# Patient Record
Sex: MALE | Race: WHITE | NOT HISPANIC OR LATINO | URBAN - METROPOLITAN AREA
[De-identification: names, ages, dates, MRNs, and addresses within clinical notes are randomized per-mention and may not be internally consistent; named-entity substitution may affect disease eponyms.]

---

## 2017-03-24 ENCOUNTER — OUTPATIENT (OUTPATIENT)
Dept: OUTPATIENT SERVICES | Facility: HOSPITAL | Age: 77
LOS: 1 days | Discharge: HOME | End: 2017-03-24

## 2017-03-24 DIAGNOSIS — C85.88 OTHER SPECIFIED TYPES OF NON-HODGKIN LYMPHOMA, LYMPH NODES OF MULTIPLE SITES: ICD-10-CM

## 2017-03-26 ENCOUNTER — RESULT REVIEW (OUTPATIENT)
Age: 77
End: 2017-03-26

## 2017-03-27 ENCOUNTER — APPOINTMENT (OUTPATIENT)
Dept: HEMATOLOGY ONCOLOGY | Facility: CLINIC | Age: 77
End: 2017-03-27

## 2017-03-27 VITALS
DIASTOLIC BLOOD PRESSURE: 78 MMHG | TEMPERATURE: 98.7 F | WEIGHT: 171.96 LBS | RESPIRATION RATE: 16 BRPM | OXYGEN SATURATION: 93 % | HEART RATE: 92 BPM | SYSTOLIC BLOOD PRESSURE: 113 MMHG

## 2017-03-27 DIAGNOSIS — G89.29 LOW BACK PAIN: ICD-10-CM

## 2017-03-27 DIAGNOSIS — M54.5 LOW BACK PAIN: ICD-10-CM

## 2017-03-27 DIAGNOSIS — Z78.9 OTHER SPECIFIED HEALTH STATUS: ICD-10-CM

## 2017-03-27 DIAGNOSIS — I89.0 LYMPHEDEMA, NOT ELSEWHERE CLASSIFIED: ICD-10-CM

## 2017-03-27 DIAGNOSIS — Z86.79 PERSONAL HISTORY OF OTHER DISEASES OF THE CIRCULATORY SYSTEM: ICD-10-CM

## 2017-03-27 DIAGNOSIS — R97.20 ELEVATED PROSTATE, SPECIFIC ANTIGEN [PSA]: ICD-10-CM

## 2017-03-27 LAB
BASOPHILS # BLD AUTO: 0 K/UL — SIGNIFICANT CHANGE UP (ref 0–0.2)
BASOPHILS NFR BLD AUTO: 0.5 % — SIGNIFICANT CHANGE UP (ref 0–2)
EOSINOPHIL # BLD AUTO: 0 K/UL — SIGNIFICANT CHANGE UP (ref 0–0.5)
EOSINOPHIL NFR BLD AUTO: 0.7 % — SIGNIFICANT CHANGE UP (ref 0–6)
HCT VFR BLD CALC: 41.4 % — SIGNIFICANT CHANGE UP (ref 39–50)
HGB BLD-MCNC: 13.8 G/DL — SIGNIFICANT CHANGE UP (ref 13–17)
LYMPHOCYTES # BLD AUTO: 1 K/UL — SIGNIFICANT CHANGE UP (ref 1–3.3)
LYMPHOCYTES # BLD AUTO: 19.2 % — SIGNIFICANT CHANGE UP (ref 13–44)
MCHC RBC-ENTMCNC: 33 PG — SIGNIFICANT CHANGE UP (ref 27–34)
MCHC RBC-ENTMCNC: 33.2 G/DL — SIGNIFICANT CHANGE UP (ref 32–36)
MCV RBC AUTO: 99.1 FL — SIGNIFICANT CHANGE UP (ref 80–100)
MONOCYTES # BLD AUTO: 0.5 K/UL — SIGNIFICANT CHANGE UP (ref 0–0.9)
MONOCYTES NFR BLD AUTO: 8.8 % — SIGNIFICANT CHANGE UP (ref 2–14)
NEUTROPHILS # BLD AUTO: 3.7 K/UL — SIGNIFICANT CHANGE UP (ref 1.8–7.4)
NEUTROPHILS NFR BLD AUTO: 70.7 % — SIGNIFICANT CHANGE UP (ref 43–77)
PLATELET # BLD AUTO: 171 K/UL — SIGNIFICANT CHANGE UP (ref 150–400)
RBC # BLD: 4.18 M/UL — LOW (ref 4.2–5.8)
RBC # FLD: 11.3 % — SIGNIFICANT CHANGE UP (ref 10.3–14.5)
WBC # BLD: 5.3 K/UL — SIGNIFICANT CHANGE UP (ref 3.8–10.5)
WBC # FLD AUTO: 5.3 K/UL — SIGNIFICANT CHANGE UP (ref 3.8–10.5)

## 2017-03-31 LAB
ALBUMIN MFR SERPL ELPH: 63.7 %
ALBUMIN SERPL ELPH-MCNC: 3.9 G/DL
ALBUMIN SERPL-MCNC: 3.6 G/DL
ALBUMIN/GLOB SERPL: 1.7 RATIO
ALP BLD-CCNC: 72 U/L
ALPHA1 GLOB MFR SERPL ELPH: 4.3 %
ALPHA1 GLOB SERPL ELPH-MCNC: 0.2 G/DL
ALPHA2 GLOB MFR SERPL ELPH: 10.1 %
ALPHA2 GLOB SERPL ELPH-MCNC: 0.6 G/DL
ALT SERPL-CCNC: 17 U/L
ANION GAP SERPL CALC-SCNC: 15 MMOL/L
AST SERPL-CCNC: 25 U/L
B-GLOBULIN MFR SERPL ELPH: 10.1 %
B-GLOBULIN SERPL ELPH-MCNC: 0.6 G/DL
B2 MICROGLOB SERPL-MCNC: 2.3 MG/L
BILIRUB SERPL-MCNC: 1.7 MG/DL
BUN SERPL-MCNC: 29 MG/DL
CALCIUM SERPL-MCNC: 8.8 MG/DL
CHLORIDE SERPL-SCNC: 104 MMOL/L
CO2 SERPL-SCNC: 26 MMOL/L
CREAT SERPL-MCNC: 1.29 MG/DL
GAMMA GLOB FLD ELPH-MCNC: 0.7 G/DL
GAMMA GLOB MFR SERPL ELPH: 11.8 %
GLUCOSE SERPL-MCNC: 114 MG/DL
HBV SURFACE AB SER QL: NONREACTIVE
HBV SURFACE AG SER QL: NONREACTIVE
HCV AB SER QL: NONREACTIVE
HCV S/CO RATIO: 0.11 S/CO
HIV1+2 AB SPEC QL IA.RAPID: NONREACTIVE
IGA SER QL IEP: 140 MG/DL
IGG SER QL IEP: 1060 MG/DL
IGM SER QL IEP: 90 MG/DL
INTERPRETATION SERPL IEP-IMP: NORMAL
LDH SERPL-CCNC: 173 U/L
POTASSIUM SERPL-SCNC: 4.5 MMOL/L
PROT SERPL-MCNC: 5.7 G/DL
SODIUM SERPL-SCNC: 145 MMOL/L

## 2017-04-01 ENCOUNTER — TRANSCRIPTION ENCOUNTER (OUTPATIENT)
Age: 77
End: 2017-04-01

## 2017-12-08 ENCOUNTER — EMERGENCY (EMERGENCY)
Facility: HOSPITAL | Age: 77
LOS: 0 days | Discharge: HOME | End: 2017-12-08
Attending: EMERGENCY MEDICINE | Admitting: EMERGENCY MEDICINE
Payer: MEDICARE

## 2017-12-08 VITALS
RESPIRATION RATE: 18 BRPM | TEMPERATURE: 98 F | SYSTOLIC BLOOD PRESSURE: 170 MMHG | HEART RATE: 72 BPM | DIASTOLIC BLOOD PRESSURE: 115 MMHG | HEIGHT: 70 IN | WEIGHT: 169.98 LBS | OXYGEN SATURATION: 100 %

## 2017-12-08 VITALS
TEMPERATURE: 98 F | OXYGEN SATURATION: 100 % | HEART RATE: 68 BPM | SYSTOLIC BLOOD PRESSURE: 165 MMHG | RESPIRATION RATE: 18 BRPM | DIASTOLIC BLOOD PRESSURE: 86 MMHG

## 2017-12-08 DIAGNOSIS — R33.9 RETENTION OF URINE, UNSPECIFIED: ICD-10-CM

## 2017-12-08 DIAGNOSIS — I25.2 OLD MYOCARDIAL INFARCTION: ICD-10-CM

## 2017-12-08 DIAGNOSIS — J43.9 EMPHYSEMA, UNSPECIFIED: ICD-10-CM

## 2017-12-08 DIAGNOSIS — E78.5 HYPERLIPIDEMIA, UNSPECIFIED: ICD-10-CM

## 2017-12-08 DIAGNOSIS — Z98.61 CORONARY ANGIOPLASTY STATUS: ICD-10-CM

## 2017-12-08 DIAGNOSIS — Z86.73 PERSONAL HISTORY OF TRANSIENT ISCHEMIC ATTACK (TIA), AND CEREBRAL INFARCTION WITHOUT RESIDUAL DEFICITS: ICD-10-CM

## 2017-12-08 DIAGNOSIS — Z91.011 ALLERGY TO MILK PRODUCTS: ICD-10-CM

## 2017-12-08 DIAGNOSIS — I25.10 ATHEROSCLEROTIC HEART DISEASE OF NATIVE CORONARY ARTERY WITHOUT ANGINA PECTORIS: ICD-10-CM

## 2017-12-08 DIAGNOSIS — Z87.01 PERSONAL HISTORY OF PNEUMONIA (RECURRENT): ICD-10-CM

## 2017-12-08 DIAGNOSIS — I48.91 UNSPECIFIED ATRIAL FIBRILLATION: ICD-10-CM

## 2017-12-08 DIAGNOSIS — Z85.72 PERSONAL HISTORY OF NON-HODGKIN LYMPHOMAS: ICD-10-CM

## 2017-12-08 DIAGNOSIS — Z88.2 ALLERGY STATUS TO SULFONAMIDES: ICD-10-CM

## 2017-12-08 LAB
APPEARANCE UR: CLEAR — SIGNIFICANT CHANGE UP
BACTERIA # UR AUTO: NEGATIVE — SIGNIFICANT CHANGE UP
BILIRUB UR-MCNC: NEGATIVE — SIGNIFICANT CHANGE UP
COLOR SPEC: YELLOW — SIGNIFICANT CHANGE UP
DIFF PNL FLD: (no result)
EPI CELLS # UR: SIGNIFICANT CHANGE UP
GLUCOSE UR QL: NEGATIVE MG/DL — SIGNIFICANT CHANGE UP
KETONES UR-MCNC: NEGATIVE — SIGNIFICANT CHANGE UP
LEUKOCYTE ESTERASE UR-ACNC: NEGATIVE — SIGNIFICANT CHANGE UP
NITRITE UR-MCNC: NEGATIVE — SIGNIFICANT CHANGE UP
PH UR: 5 — SIGNIFICANT CHANGE UP (ref 5–8)
PROT UR-MCNC: NEGATIVE MG/DL — SIGNIFICANT CHANGE UP
RBC CASTS # UR COMP ASSIST: (no result) /HPF (ref 0–4)
SP GR SPEC: 1 — LOW (ref 1.01–1.02)
UROBILINOGEN FLD QL: NEGATIVE MG/DL — SIGNIFICANT CHANGE UP
WBC UR QL: SIGNIFICANT CHANGE UP

## 2017-12-08 PROCEDURE — 99283 EMERGENCY DEPT VISIT LOW MDM: CPT | Mod: 25

## 2017-12-08 NOTE — ED ADULT NURSE NOTE - OBJECTIVE STATEMENT
Pt reports prostate biopsy yesterday with difficulty voiding starting last night. Pt last voided 11pm. Pt doe placed as ordered using sterile technique. 16fr draining to SBD. Pt reports good relief at this time with yellow urine draining.  MD aware.

## 2017-12-08 NOTE — ED PROVIDER NOTE - PMH
Atrial Fibrillation    CAD (coronary artery disease)    Empyema of lung    Hyperlipidemia    Lymphoma    Old MI (myocardial infarction)    Recurrent pneumonia    TIA (transient ischemic attack)

## 2017-12-08 NOTE — ED PROVIDER NOTE - OBJECTIVE STATEMENT
78 yo male c/o urinary retention this morning, pt had prostate biopsy yesterday. no fever, chills, no noausea or vomiting

## 2017-12-08 NOTE — ED ADULT NURSE REASSESSMENT NOTE - NS ED NURSE REASSESS COMMENT FT1
Pt doe catheter changed to leg bag.  Pt verbalizes understanding of use of leg bag.  Pt ambulating with steady gait.

## 2017-12-12 ENCOUNTER — EMERGENCY (EMERGENCY)
Facility: HOSPITAL | Age: 77
LOS: 0 days | Discharge: HOME | End: 2017-12-12
Attending: EMERGENCY MEDICINE | Admitting: EMERGENCY MEDICINE
Payer: MEDICARE

## 2017-12-12 VITALS
RESPIRATION RATE: 18 BRPM | SYSTOLIC BLOOD PRESSURE: 128 MMHG | DIASTOLIC BLOOD PRESSURE: 87 MMHG | OXYGEN SATURATION: 100 % | HEART RATE: 61 BPM

## 2017-12-12 VITALS
DIASTOLIC BLOOD PRESSURE: 113 MMHG | WEIGHT: 164.91 LBS | SYSTOLIC BLOOD PRESSURE: 170 MMHG | TEMPERATURE: 98 F | HEART RATE: 114 BPM | HEIGHT: 70 IN

## 2017-12-12 DIAGNOSIS — Z88.2 ALLERGY STATUS TO SULFONAMIDES: ICD-10-CM

## 2017-12-12 DIAGNOSIS — E78.5 HYPERLIPIDEMIA, UNSPECIFIED: ICD-10-CM

## 2017-12-12 DIAGNOSIS — I25.2 OLD MYOCARDIAL INFARCTION: ICD-10-CM

## 2017-12-12 DIAGNOSIS — Z98.61 CORONARY ANGIOPLASTY STATUS: ICD-10-CM

## 2017-12-12 DIAGNOSIS — Z86.73 PERSONAL HISTORY OF TRANSIENT ISCHEMIC ATTACK (TIA), AND CEREBRAL INFARCTION WITHOUT RESIDUAL DEFICITS: ICD-10-CM

## 2017-12-12 DIAGNOSIS — Z98.890 OTHER SPECIFIED POSTPROCEDURAL STATES: Chronic | ICD-10-CM

## 2017-12-12 DIAGNOSIS — I48.91 UNSPECIFIED ATRIAL FIBRILLATION: ICD-10-CM

## 2017-12-12 DIAGNOSIS — Z91.011 ALLERGY TO MILK PRODUCTS: ICD-10-CM

## 2017-12-12 DIAGNOSIS — Z85.72 PERSONAL HISTORY OF NON-HODGKIN LYMPHOMAS: ICD-10-CM

## 2017-12-12 DIAGNOSIS — J43.9 EMPHYSEMA, UNSPECIFIED: ICD-10-CM

## 2017-12-12 DIAGNOSIS — Z87.01 PERSONAL HISTORY OF PNEUMONIA (RECURRENT): ICD-10-CM

## 2017-12-12 DIAGNOSIS — R33.9 RETENTION OF URINE, UNSPECIFIED: ICD-10-CM

## 2017-12-12 DIAGNOSIS — I25.10 ATHEROSCLEROTIC HEART DISEASE OF NATIVE CORONARY ARTERY WITHOUT ANGINA PECTORIS: ICD-10-CM

## 2017-12-12 LAB
APPEARANCE UR: CLEAR — SIGNIFICANT CHANGE UP
BACTERIA # UR AUTO: (no result)
BILIRUB UR-MCNC: NEGATIVE — SIGNIFICANT CHANGE UP
COLOR SPEC: YELLOW — SIGNIFICANT CHANGE UP
DIFF PNL FLD: (no result)
EPI CELLS # UR: SIGNIFICANT CHANGE UP
GLUCOSE UR QL: NEGATIVE MG/DL — SIGNIFICANT CHANGE UP
KETONES UR-MCNC: NEGATIVE — SIGNIFICANT CHANGE UP
LEUKOCYTE ESTERASE UR-ACNC: (no result)
NITRITE UR-MCNC: NEGATIVE — SIGNIFICANT CHANGE UP
PH UR: 6.5 — SIGNIFICANT CHANGE UP (ref 5–8)
PROT UR-MCNC: NEGATIVE MG/DL — SIGNIFICANT CHANGE UP
RBC CASTS # UR COMP ASSIST: >50 /HPF (ref 0–4)
SP GR SPEC: 1.01 — SIGNIFICANT CHANGE UP (ref 1.01–1.02)
UROBILINOGEN FLD QL: 1 MG/DL
WBC UR QL: SIGNIFICANT CHANGE UP

## 2017-12-12 PROCEDURE — 99283 EMERGENCY DEPT VISIT LOW MDM: CPT | Mod: 25

## 2017-12-12 NOTE — ED ADULT TRIAGE NOTE - CHIEF COMPLAINT QUOTE
States he was seen here several days ago for urinary retention, catheter was placed. Yesterday doe was removed by urologist and now patient having urinary retention. States he has been unable to urinate for several hours.

## 2017-12-12 NOTE — ED PROVIDER NOTE - OBJECTIVE STATEMENT
76 yo male pw urinary retention. Pt states he had urinary cathter removed yesterday after having it in for 3 days s/p retention s/p prostate biopsy.  Pt saw his urologist yesterday.

## 2018-12-03 ENCOUNTER — APPOINTMENT (OUTPATIENT)
Dept: OPHTHALMOLOGY | Facility: CLINIC | Age: 78
End: 2018-12-03
Payer: MEDICARE

## 2018-12-03 PROCEDURE — 92015 DETERMINE REFRACTIVE STATE: CPT

## 2018-12-03 PROCEDURE — 92060 SENSORIMOTOR EXAMINATION: CPT

## 2018-12-03 PROCEDURE — 92012 INTRM OPH EXAM EST PATIENT: CPT

## 2019-02-14 ENCOUNTER — OUTPATIENT (OUTPATIENT)
Dept: OUTPATIENT SERVICES | Facility: HOSPITAL | Age: 79
LOS: 1 days | Discharge: HOME | End: 2019-02-14

## 2019-02-14 DIAGNOSIS — C85.88 OTHER SPECIFIED TYPES OF NON-HODGKIN LYMPHOMA, LYMPH NODES OF MULTIPLE SITES: ICD-10-CM

## 2019-02-14 DIAGNOSIS — Z98.890 OTHER SPECIFIED POSTPROCEDURAL STATES: Chronic | ICD-10-CM

## 2019-02-24 ENCOUNTER — FORM ENCOUNTER (OUTPATIENT)
Age: 79
End: 2019-02-24

## 2019-02-25 ENCOUNTER — OUTPATIENT (OUTPATIENT)
Dept: OUTPATIENT SERVICES | Facility: HOSPITAL | Age: 79
LOS: 1 days | End: 2019-02-25
Payer: MEDICARE

## 2019-02-25 ENCOUNTER — RESULT REVIEW (OUTPATIENT)
Age: 79
End: 2019-02-25

## 2019-02-25 ENCOUNTER — APPOINTMENT (OUTPATIENT)
Dept: RADIOLOGY | Facility: IMAGING CENTER | Age: 79
End: 2019-02-25
Payer: MEDICARE

## 2019-02-25 ENCOUNTER — APPOINTMENT (OUTPATIENT)
Dept: HEMATOLOGY ONCOLOGY | Facility: CLINIC | Age: 79
End: 2019-02-25
Payer: MEDICARE

## 2019-02-25 VITALS
TEMPERATURE: 97.5 F | OXYGEN SATURATION: 96 % | RESPIRATION RATE: 16 BRPM | HEART RATE: 76 BPM | SYSTOLIC BLOOD PRESSURE: 149 MMHG | DIASTOLIC BLOOD PRESSURE: 90 MMHG | WEIGHT: 170.63 LBS

## 2019-02-25 DIAGNOSIS — Z87.09 PERSONAL HISTORY OF OTHER DISEASES OF THE RESPIRATORY SYSTEM: ICD-10-CM

## 2019-02-25 DIAGNOSIS — C82.90 FOLLICULAR LYMPHOMA, UNSPECIFIED, UNSPECIFIED SITE: ICD-10-CM

## 2019-02-25 DIAGNOSIS — Z92.21 PERSONAL HISTORY OF ANTINEOPLASTIC CHEMOTHERAPY: ICD-10-CM

## 2019-02-25 DIAGNOSIS — H53.2 DIPLOPIA: ICD-10-CM

## 2019-02-25 DIAGNOSIS — Z98.890 OTHER SPECIFIED POSTPROCEDURAL STATES: Chronic | ICD-10-CM

## 2019-02-25 DIAGNOSIS — R05 COUGH: ICD-10-CM

## 2019-02-25 LAB
ALBUMIN SERPL ELPH-MCNC: 4.4 G/DL
ALP BLD-CCNC: 85 U/L
ALT SERPL-CCNC: 19 U/L
ANION GAP SERPL CALC-SCNC: 9 MMOL/L
AST SERPL-CCNC: 28 U/L
B2 MICROGLOB SERPL-MCNC: 3.1 MG/L
BASOPHILS # BLD AUTO: 0 K/UL — SIGNIFICANT CHANGE UP (ref 0–0.2)
BASOPHILS NFR BLD AUTO: 0.4 % — SIGNIFICANT CHANGE UP (ref 0–2)
BILIRUB SERPL-MCNC: 1.3 MG/DL
BUN SERPL-MCNC: 23 MG/DL
CALCIUM SERPL-MCNC: 9.4 MG/DL
CHLORIDE SERPL-SCNC: 105 MMOL/L
CO2 SERPL-SCNC: 30 MMOL/L
CREAT SERPL-MCNC: 1.06 MG/DL
EOSINOPHIL # BLD AUTO: 0.1 K/UL — SIGNIFICANT CHANGE UP (ref 0–0.5)
EOSINOPHIL NFR BLD AUTO: 1.8 % — SIGNIFICANT CHANGE UP (ref 0–6)
GLUCOSE SERPL-MCNC: 101 MG/DL
HCT VFR BLD CALC: 41.3 % — SIGNIFICANT CHANGE UP (ref 39–50)
HGB BLD-MCNC: 14 G/DL — SIGNIFICANT CHANGE UP (ref 13–17)
LDH SERPL-CCNC: 238 U/L
LYMPHOCYTES # BLD AUTO: 1 K/UL — SIGNIFICANT CHANGE UP (ref 1–3.3)
LYMPHOCYTES # BLD AUTO: 23 % — SIGNIFICANT CHANGE UP (ref 13–44)
MCHC RBC-ENTMCNC: 33.2 PG — SIGNIFICANT CHANGE UP (ref 27–34)
MCHC RBC-ENTMCNC: 33.9 G/DL — SIGNIFICANT CHANGE UP (ref 32–36)
MCV RBC AUTO: 98 FL — SIGNIFICANT CHANGE UP (ref 80–100)
MONOCYTES # BLD AUTO: 0.3 K/UL — SIGNIFICANT CHANGE UP (ref 0–0.9)
MONOCYTES NFR BLD AUTO: 7.8 % — SIGNIFICANT CHANGE UP (ref 2–14)
NEUTROPHILS # BLD AUTO: 2.8 K/UL — SIGNIFICANT CHANGE UP (ref 1.8–7.4)
NEUTROPHILS NFR BLD AUTO: 67 % — SIGNIFICANT CHANGE UP (ref 43–77)
PLATELET # BLD AUTO: 181 K/UL — SIGNIFICANT CHANGE UP (ref 150–400)
POTASSIUM SERPL-SCNC: 5.2 MMOL/L
PROT SERPL-MCNC: 6.7 G/DL
RBC # BLD: 4.21 M/UL — SIGNIFICANT CHANGE UP (ref 4.2–5.8)
RBC # FLD: 10.8 % — SIGNIFICANT CHANGE UP (ref 10.3–14.5)
SODIUM SERPL-SCNC: 144 MMOL/L
WBC # BLD: 4.2 K/UL — SIGNIFICANT CHANGE UP (ref 3.8–10.5)
WBC # FLD AUTO: 4.2 K/UL — SIGNIFICANT CHANGE UP (ref 3.8–10.5)

## 2019-02-25 PROCEDURE — 71046 X-RAY EXAM CHEST 2 VIEWS: CPT

## 2019-02-25 PROCEDURE — 99214 OFFICE O/P EST MOD 30 MIN: CPT

## 2019-02-25 PROCEDURE — 71046 X-RAY EXAM CHEST 2 VIEWS: CPT | Mod: 26

## 2019-02-25 RX ORDER — FINASTERIDE 5 MG/1
5 TABLET, FILM COATED ORAL DAILY
Refills: 0 | Status: ACTIVE | COMMUNITY
Start: 2019-02-25

## 2019-02-25 RX ORDER — FUROSEMIDE 20 MG/1
20 TABLET ORAL
Refills: 0 | Status: ACTIVE | COMMUNITY
Start: 2019-02-25

## 2019-02-25 NOTE — ASSESSMENT
[FreeTextEntry1] : FL in long term clinical remission following therapy with FR. It is rare in the low grade lymphoproliferative diseases to obtain a remission of this quality and duration.  In any case, in the unlikely event of very late recurrence, it would be expected that his likelihood of survival would not differ from age and co-morbidity matched controls without NHL. \par Recurrent URI with cough. Past hx of empyema, no fever. Quant immunoglobulins were measured about 2 years ago and were normal. We will see if they can be retested with blood drawn today. Breath sounds decreased at right base on examination today, a CXR will be obtained today. He will call for the results. I do not see a need for abx at this time.\par We will consider pulmonology consultation if pulmonary complaints persist \par No evidence of CNS dz re diplopia; seen by Neuroopthalmology\par RV 1 yr or prn [Palliative] : Goals of care discussed with patient: Palliative [Palliative Care Plan] : not applicable at this time

## 2019-02-25 NOTE — CONSULT LETTER
[Dear  ___] : Dear  [unfilled], [Courtesy Letter:] : I had the pleasure of seeing your patient, [unfilled], in my office today. [FreeTextEntry2] : Steven Goldberg, M.D.

## 2019-02-25 NOTE — HISTORY OF PRESENT ILLNESS
[Disease:__________________________] : Disease: [unfilled] [de-identified] : Mr. 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  He has a history of a positive PPD and underwent a negative bronchoscopy and BAL  in 2002. He has had a h/o chronic LBP. A right middle lobe nodule was noted in late 2002 and this proved benign on biopsy.  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   He has been maintained on warfarin for over 15 years.. \par \par In 2009, he complained of diplopia and difficulties focusing.  He had an unrevealing neuro-ophthalmology examination. He has  had persistent headaches which  responded to gabapentin 600 mg up to twice daily.  He  had a negative MRI of the brain along with negative neurologic evaluation by Dr. Sidney Lamb.\par \par He had an abdominal CT scan in October 2006 which demonstrated an increase in subcentimeter retrocrural lymphadenopathy. The scan was repeated in 2009 and showed no evidence of lymphoma.   \par \par More recently, he was evaluated for treatment of lymphedema.\par \par \par  [de-identified] : IV [de-identified] : follicular lymphoma [de-identified] : Follicular lymphoma in long term remission, has skeletal disease, stage IV disease. \par He c/o recurrent URI, a total of 4 since August 2018. \par In March 2017 he had normal quant immunoglobulins. \par He has a mildly productive cough with no blood in sputum. No fevers \par At the beginning of this URI he did have night sweats\par Tx with abx the first 3 times he had URIs and is presently not on abx \par He was evaluated by Dr. West for diplopia in 12/2018 and was given a new prescription \par Back pain improved since last visit. \par He has a h/o BPH; being followed by Dr. Sewell, had (-) MRI, declined to have biopsy.\par WBC today 4.2, Hgb 14.0, Plt 181,000

## 2019-02-25 NOTE — ADDENDUM
[FreeTextEntry1] : Documented by Jami Bridges acting as a scribe for Dr. Gutierrez Crowley on 2/25/19. \par \par All medical record entries made by the Scribe were at my, Dr. Gutierrez Crowley's, direction and personally dictated by me on 2/25/19. I have reviewed the chart and agree that the record accurately reflects my personal performance of the history, physical exam, procedure and imaging.\par

## 2019-02-25 NOTE — PHYSICAL EXAM
[Fully active, able to carry on all pre-disease performance without restriction] : Status 0 - Fully active, able to carry on all pre-disease performance without restriction [Normal] : RRR, normal S1S2, no murmurs, rubs, gallops [de-identified] : decreased breath sounds right base  [de-identified] : Mild chronic LE edema  [de-identified] : No CVAT  [de-identified] : Multiple seborrheic keratosis

## 2019-03-01 LAB
ALBUMIN MFR SERPL ELPH: 59 %
ALBUMIN SERPL-MCNC: 4 G/DL
ALBUMIN/GLOB SERPL: 1.5 RATIO
ALPHA1 GLOB MFR SERPL ELPH: 5.4 %
ALPHA1 GLOB SERPL ELPH-MCNC: 0.4 G/DL
ALPHA2 GLOB MFR SERPL ELPH: 12.6 %
ALPHA2 GLOB SERPL ELPH-MCNC: 0.8 G/DL
B-GLOBULIN MFR SERPL ELPH: 10.9 %
B-GLOBULIN SERPL ELPH-MCNC: 0.7 G/DL
DEPRECATED KAPPA LC FREE/LAMBDA SER: 1.08 RATIO
GAMMA GLOB FLD ELPH-MCNC: 0.8 G/DL
GAMMA GLOB MFR SERPL ELPH: 12.1 %
IGA SER QL IEP: 178 MG/DL
IGG SER QL IEP: 722 MG/DL
IGM SER QL IEP: 93 MG/DL
INTERPRETATION SERPL IEP-IMP: NORMAL
KAPPA LC CSF-MCNC: 1.74 MG/DL
KAPPA LC SERPL-MCNC: 1.88 MG/DL
M PROTEIN SPEC IFE-MCNC: NORMAL
PROT SERPL-MCNC: 6.7 G/DL
PROT SERPL-MCNC: 6.7 G/DL

## 2019-07-02 ENCOUNTER — INPATIENT (INPATIENT)
Facility: HOSPITAL | Age: 79
LOS: 2 days | Discharge: HOME | End: 2019-07-05
Attending: HOSPITALIST | Admitting: HOSPITALIST
Payer: MEDICARE

## 2019-07-02 VITALS — WEIGHT: 179.9 LBS | HEIGHT: 69 IN

## 2019-07-02 DIAGNOSIS — Z98.890 OTHER SPECIFIED POSTPROCEDURAL STATES: Chronic | ICD-10-CM

## 2019-07-02 LAB
ALBUMIN SERPL ELPH-MCNC: 3 G/DL — LOW (ref 3.3–5)
ALP SERPL-CCNC: 80 U/L — SIGNIFICANT CHANGE UP (ref 40–120)
ALT FLD-CCNC: 40 U/L — SIGNIFICANT CHANGE UP (ref 12–78)
ANION GAP SERPL CALC-SCNC: 6 MMOL/L — SIGNIFICANT CHANGE UP (ref 5–17)
APTT BLD: 38.6 SEC — HIGH (ref 27.5–36.3)
AST SERPL-CCNC: 73 U/L — HIGH (ref 15–37)
BASOPHILS # BLD AUTO: 0 K/UL — SIGNIFICANT CHANGE UP (ref 0–0.2)
BASOPHILS NFR BLD AUTO: 0 % — SIGNIFICANT CHANGE UP (ref 0–2)
BILIRUB SERPL-MCNC: 2.7 MG/DL — HIGH (ref 0.2–1.2)
BUN SERPL-MCNC: 33 MG/DL — HIGH (ref 7–23)
CALCIUM SERPL-MCNC: 8.1 MG/DL — LOW (ref 8.5–10.1)
CHLORIDE SERPL-SCNC: 98 MMOL/L — SIGNIFICANT CHANGE UP (ref 96–108)
CO2 SERPL-SCNC: 31 MMOL/L — SIGNIFICANT CHANGE UP (ref 22–31)
CREAT SERPL-MCNC: 1.46 MG/DL — HIGH (ref 0.5–1.3)
DOHLE BOD BLD QL SMEAR: PRESENT — SIGNIFICANT CHANGE UP
EOSINOPHIL # BLD AUTO: 0.06 K/UL — SIGNIFICANT CHANGE UP (ref 0–0.5)
EOSINOPHIL NFR BLD AUTO: 1 % — SIGNIFICANT CHANGE UP (ref 0–6)
GLUCOSE SERPL-MCNC: 135 MG/DL — HIGH (ref 70–99)
HCT VFR BLD CALC: 39.9 % — SIGNIFICANT CHANGE UP (ref 39–50)
HGB BLD-MCNC: 13.4 G/DL — SIGNIFICANT CHANGE UP (ref 13–17)
INR BLD: 3 RATIO — HIGH (ref 0.88–1.16)
LACTATE SERPL-SCNC: 1.4 MMOL/L — SIGNIFICANT CHANGE UP (ref 0.7–2)
LG PLATELETS BLD QL AUTO: SLIGHT — SIGNIFICANT CHANGE UP
LYMPHOCYTES # BLD AUTO: 0.48 K/UL — LOW (ref 1–3.3)
LYMPHOCYTES # BLD AUTO: 8 % — LOW (ref 13–44)
MANUAL SMEAR VERIFICATION: SIGNIFICANT CHANGE UP
MCHC RBC-ENTMCNC: 32.5 PG — SIGNIFICANT CHANGE UP (ref 27–34)
MCHC RBC-ENTMCNC: 33.6 GM/DL — SIGNIFICANT CHANGE UP (ref 32–36)
MCV RBC AUTO: 96.8 FL — SIGNIFICANT CHANGE UP (ref 80–100)
MONOCYTES # BLD AUTO: 0.48 K/UL — SIGNIFICANT CHANGE UP (ref 0–0.9)
MONOCYTES NFR BLD AUTO: 8 % — SIGNIFICANT CHANGE UP (ref 2–14)
NEUTROPHILS # BLD AUTO: 4.82 K/UL — SIGNIFICANT CHANGE UP (ref 1.8–7.4)
NEUTROPHILS NFR BLD AUTO: 78 % — HIGH (ref 43–77)
NEUTS BAND # BLD: 2 % — SIGNIFICANT CHANGE UP (ref 0–8)
NRBC # BLD: 0 /100 — SIGNIFICANT CHANGE UP (ref 0–0)
NRBC # BLD: SIGNIFICANT CHANGE UP /100 WBCS (ref 0–0)
PLAT MORPH BLD: ABNORMAL
PLATELET # BLD AUTO: 184 K/UL — SIGNIFICANT CHANGE UP (ref 150–400)
POTASSIUM SERPL-MCNC: 3.7 MMOL/L — SIGNIFICANT CHANGE UP (ref 3.5–5.3)
POTASSIUM SERPL-SCNC: 3.7 MMOL/L — SIGNIFICANT CHANGE UP (ref 3.5–5.3)
PROT SERPL-MCNC: 6.9 GM/DL — SIGNIFICANT CHANGE UP (ref 6–8.3)
PROTHROM AB SERPL-ACNC: 34.4 SEC — HIGH (ref 10–12.9)
RBC # BLD: 4.12 M/UL — LOW (ref 4.2–5.8)
RBC # FLD: 12.6 % — SIGNIFICANT CHANGE UP (ref 10.3–14.5)
RBC BLD AUTO: NORMAL — SIGNIFICANT CHANGE UP
SODIUM SERPL-SCNC: 135 MMOL/L — SIGNIFICANT CHANGE UP (ref 135–145)
TOXIC GRANULES BLD QL SMEAR: PRESENT — SIGNIFICANT CHANGE UP
VARIANT LYMPHS # BLD: 3 % — SIGNIFICANT CHANGE UP (ref 0–6)
WBC # BLD: 6.02 K/UL — SIGNIFICANT CHANGE UP (ref 3.8–10.5)
WBC # FLD AUTO: 6.02 K/UL — SIGNIFICANT CHANGE UP (ref 3.8–10.5)

## 2019-07-02 PROCEDURE — 99285 EMERGENCY DEPT VISIT HI MDM: CPT

## 2019-07-02 PROCEDURE — 93010 ELECTROCARDIOGRAM REPORT: CPT

## 2019-07-02 PROCEDURE — 71045 X-RAY EXAM CHEST 1 VIEW: CPT | Mod: 26

## 2019-07-02 RX ORDER — SODIUM CHLORIDE 9 MG/ML
2500 INJECTION, SOLUTION INTRAVENOUS ONCE
Refills: 0 | Status: COMPLETED | OUTPATIENT
Start: 2019-07-02 | End: 2019-07-02

## 2019-07-02 RX ORDER — CEFTRIAXONE 500 MG/1
1000 INJECTION, POWDER, FOR SOLUTION INTRAMUSCULAR; INTRAVENOUS ONCE
Refills: 0 | Status: COMPLETED | OUTPATIENT
Start: 2019-07-02 | End: 2019-07-02

## 2019-07-02 RX ORDER — CEFTRIAXONE 500 MG/1
1000 INJECTION, POWDER, FOR SOLUTION INTRAMUSCULAR; INTRAVENOUS ONCE
Refills: 0 | Status: DISCONTINUED | OUTPATIENT
Start: 2019-07-02 | End: 2019-07-02

## 2019-07-02 RX ORDER — AZITHROMYCIN 500 MG/1
500 TABLET, FILM COATED ORAL ONCE
Refills: 0 | Status: COMPLETED | OUTPATIENT
Start: 2019-07-02 | End: 2019-07-02

## 2019-07-02 RX ADMIN — CEFTRIAXONE 1000 MILLIGRAM(S): 500 INJECTION, POWDER, FOR SOLUTION INTRAMUSCULAR; INTRAVENOUS at 22:22

## 2019-07-02 RX ADMIN — SODIUM CHLORIDE 2500 MILLILITER(S): 9 INJECTION, SOLUTION INTRAVENOUS at 23:25

## 2019-07-02 RX ADMIN — AZITHROMYCIN 500 MILLIGRAM(S): 500 TABLET, FILM COATED ORAL at 23:39

## 2019-07-02 RX ADMIN — AZITHROMYCIN 255 MILLIGRAM(S): 500 TABLET, FILM COATED ORAL at 22:39

## 2019-07-02 RX ADMIN — SODIUM CHLORIDE 2500 MILLILITER(S): 9 INJECTION, SOLUTION INTRAVENOUS at 22:23

## 2019-07-02 NOTE — ED ADULT NURSE NOTE - OBJECTIVE STATEMENT
pt reports having a productive cough starting 4 days ago, pt reports his grandchildren are sick at home with a respiratory virus but he is unsure which one, pt denies SOB, ANDERSON, or chest pains, pt o2 sat on room 89, pt reports fever at home of 103.4

## 2019-07-02 NOTE — ED PROVIDER NOTE - PHYSICAL EXAMINATION
Constitutional: mild distress AAOx3  Eyes: PERRLA EOMI, +discharge from bilateral eye crusting   Head: Normocephalic atraumatic   Mouth: MMM +mild posterior pharynx erythema   Cardiac: regular rate   Resp: Lungs CTAB, +02 sat 88 on room air   GI: Abd s/nt/nd  Neuro: CN2-12 intact  Skin: No rashes Constitutional: mild distress AAOx3  Eyes: PERRLA EOMI, +discharge from bilateral eye crusting   Head: Normocephalic atraumatic   Mouth: MMM +mild posterior pharynx erythema   Cardiac: regular rate   Resp: Lungs CTAB, +02 sat 88 on room air course breath sounds b/l bases  GI: Abd s/nt/nd  Neuro: CN2-12 intact  Skin: No rashes

## 2019-07-02 NOTE — ED PROVIDER NOTE - OBJECTIVE STATEMENT
80 y/o male with a PMHx of A fib, CAD, empyema of lung, HLD, lymphoma, MI, recurrent PNA, TIA  presents to the ED c/o fever (103.4). +weakness +cough +dark green and brown flem +rhinorrhea +nausea +SOB Pt family members at home are also sick. Pt is on a z pack for two days. Pt was previously in [facility] for 30 days and was treated for a PE when he had PNA. PCP: Dr. Goldberg 78 y/o male with a PMHx of A fib, CAD, empyema of lung, HLD, lymphoma, MI, recurrent PNA, TIA  presents to the ED c/o fever (103.4). +weakness +cough +dark green and brown flem +rhinorrhea +nausea +SOB Pt family members at home are also sick. Pt is on a z pack for two days. Pt was previously in [facility] for 30 days and was treated for a PE when he had PNA. came in for sob cough fever. PCP: Dr. Goldberg

## 2019-07-02 NOTE — ED PROVIDER NOTE - CLINICAL SUMMARY MEDICAL DECISION MAKING FREE TEXT BOX
79 male with hx of A fib, CAD, empyema of lung, HLD, lymphoma, MI, recurrent PNA, TIA presents to the ED for fever to 103, cough, and SOB. Family members sick at home. Exam with pt hypoxic to 88 % room air and course breath sounds bilaterally. Concern for PNA likely viral. Plan: Will obtain labs, x-ray, and admit.

## 2019-07-02 NOTE — ED PROVIDER NOTE - NS_ ATTENDINGSCRIBEDETAILS _ED_A_ED_FT
I, Nir Her MD,  performed the initial face to face bedside interview with this patient regarding history of present illness, review of symptoms and relevant past medical, social and family history.  I completed an independent physical examination.  I was the initial provider who evaluated this patient.  The history, relevant review of systems, past medical and surgical history, medical decision making, and physical examination was documented by the scribe in my presence and I attest to the accuracy of the documentation.

## 2019-07-02 NOTE — ED PROVIDER NOTE - NS ED ROS FT
Constitutional: No chills +fever  Eyes: No visual changes  HEENT: No throat pain +Rhinorrhea  CV: No chest pain   Resp: +SOB +cough +dark green and brown flem  GI: No abd pain or vomiting +nausea  : No dysuria  MSK: No musculoskeletal pain +weakness   Skin: No rash  Neuro: No headache Constitutional: No chills +fever  Eyes: No visual changes  HEENT: No throat pain +Rhinorrhea  CV: No chest pain   Resp: +SOB +cough +dark green and brown phlegm  GI: No abd pain or vomiting +nausea  : No dysuria  MSK: No musculoskeletal pain +weakness   Skin: No rash  Neuro: No headache

## 2019-07-02 NOTE — ED PROVIDER NOTE - PROGRESS NOTE DETAILS
pt with hypoxia/fever likely 2/2 viral bronchitis - endorsed to Dr. Franks. Nir Her M.D., Attending Physician

## 2019-07-03 LAB
APPEARANCE UR: CLEAR — SIGNIFICANT CHANGE UP
BACTERIA # UR AUTO: ABNORMAL
BILIRUB UR-MCNC: ABNORMAL
COLOR SPEC: ABNORMAL
DIFF PNL FLD: ABNORMAL
EPI CELLS # UR: SIGNIFICANT CHANGE UP
GLUCOSE UR QL: NEGATIVE MG/DL — SIGNIFICANT CHANGE UP
INR BLD: 3.88 RATIO — HIGH (ref 0.88–1.16)
KETONES UR-MCNC: ABNORMAL
LEUKOCYTE ESTERASE UR-ACNC: ABNORMAL
NITRITE UR-MCNC: NEGATIVE — SIGNIFICANT CHANGE UP
PH UR: 6 — SIGNIFICANT CHANGE UP (ref 5–8)
PROT UR-MCNC: 100 MG/DL
PROTHROM AB SERPL-ACNC: 44.9 SEC — HIGH (ref 10–12.9)
RAPID RVP RESULT: DETECTED
RBC CASTS # UR COMP ASSIST: SIGNIFICANT CHANGE UP /HPF (ref 0–4)
RV+EV RNA SPEC QL NAA+PROBE: DETECTED
SP GR SPEC: 1.01 — SIGNIFICANT CHANGE UP (ref 1.01–1.02)
UROBILINOGEN FLD QL: 4 MG/DL
WBC UR QL: SIGNIFICANT CHANGE UP

## 2019-07-03 RX ORDER — AZITHROMYCIN 500 MG/1
500 TABLET, FILM COATED ORAL ONCE
Refills: 0 | Status: COMPLETED | OUTPATIENT
Start: 2019-07-03 | End: 2019-07-03

## 2019-07-03 RX ORDER — ONDANSETRON 8 MG/1
4 TABLET, FILM COATED ORAL EVERY 6 HOURS
Refills: 0 | Status: DISCONTINUED | OUTPATIENT
Start: 2019-07-03 | End: 2019-07-05

## 2019-07-03 RX ORDER — FUROSEMIDE 40 MG
20 TABLET ORAL DAILY
Refills: 0 | Status: DISCONTINUED | OUTPATIENT
Start: 2019-07-03 | End: 2019-07-03

## 2019-07-03 RX ORDER — AZITHROMYCIN 500 MG/1
250 TABLET, FILM COATED ORAL DAILY
Refills: 0 | Status: DISCONTINUED | OUTPATIENT
Start: 2019-07-04 | End: 2019-07-05

## 2019-07-03 RX ORDER — CEFTRIAXONE 500 MG/1
1000 INJECTION, POWDER, FOR SOLUTION INTRAMUSCULAR; INTRAVENOUS EVERY 24 HOURS
Refills: 0 | Status: DISCONTINUED | OUTPATIENT
Start: 2019-07-03 | End: 2019-07-05

## 2019-07-03 RX ORDER — IPRATROPIUM/ALBUTEROL SULFATE 18-103MCG
3 AEROSOL WITH ADAPTER (GRAM) INHALATION EVERY 6 HOURS
Refills: 0 | Status: DISCONTINUED | OUTPATIENT
Start: 2019-07-03 | End: 2019-07-05

## 2019-07-03 RX ORDER — FINASTERIDE 5 MG/1
5 TABLET, FILM COATED ORAL DAILY
Refills: 0 | Status: DISCONTINUED | OUTPATIENT
Start: 2019-07-03 | End: 2019-07-05

## 2019-07-03 RX ORDER — WARFARIN SODIUM 2.5 MG/1
2 TABLET ORAL DAILY
Refills: 0 | Status: DISCONTINUED | OUTPATIENT
Start: 2019-07-03 | End: 2019-07-05

## 2019-07-03 RX ORDER — SENNA PLUS 8.6 MG/1
2 TABLET ORAL AT BEDTIME
Refills: 0 | Status: DISCONTINUED | OUTPATIENT
Start: 2019-07-03 | End: 2019-07-05

## 2019-07-03 RX ORDER — CEFTRIAXONE 500 MG/1
1000 INJECTION, POWDER, FOR SOLUTION INTRAMUSCULAR; INTRAVENOUS EVERY 24 HOURS
Refills: 0 | Status: DISCONTINUED | OUTPATIENT
Start: 2019-07-03 | End: 2019-07-03

## 2019-07-03 RX ORDER — ASPIRIN/CALCIUM CARB/MAGNESIUM 324 MG
81 TABLET ORAL DAILY
Refills: 0 | Status: DISCONTINUED | OUTPATIENT
Start: 2019-07-03 | End: 2019-07-05

## 2019-07-03 RX ORDER — TAMSULOSIN HYDROCHLORIDE 0.4 MG/1
0.4 CAPSULE ORAL AT BEDTIME
Refills: 0 | Status: DISCONTINUED | OUTPATIENT
Start: 2019-07-03 | End: 2019-07-05

## 2019-07-03 RX ORDER — ATORVASTATIN CALCIUM 80 MG/1
20 TABLET, FILM COATED ORAL AT BEDTIME
Refills: 0 | Status: DISCONTINUED | OUTPATIENT
Start: 2019-07-03 | End: 2019-07-05

## 2019-07-03 RX ADMIN — AZITHROMYCIN 500 MILLIGRAM(S): 500 TABLET, FILM COATED ORAL at 11:20

## 2019-07-03 RX ADMIN — TAMSULOSIN HYDROCHLORIDE 0.4 MILLIGRAM(S): 0.4 CAPSULE ORAL at 21:38

## 2019-07-03 RX ADMIN — Medication 3 MILLILITER(S): at 13:23

## 2019-07-03 RX ADMIN — FINASTERIDE 5 MILLIGRAM(S): 5 TABLET, FILM COATED ORAL at 11:20

## 2019-07-03 RX ADMIN — Medication 3 MILLILITER(S): at 21:01

## 2019-07-03 RX ADMIN — ATORVASTATIN CALCIUM 20 MILLIGRAM(S): 80 TABLET, FILM COATED ORAL at 21:38

## 2019-07-03 RX ADMIN — Medication 600 MILLIGRAM(S): at 17:39

## 2019-07-03 RX ADMIN — CEFTRIAXONE 1000 MILLIGRAM(S): 500 INJECTION, POWDER, FOR SOLUTION INTRAMUSCULAR; INTRAVENOUS at 21:38

## 2019-07-03 RX ADMIN — Medication 1 DROP(S): at 17:40

## 2019-07-03 NOTE — CHART NOTE - NSCHARTNOTEFT_GEN_A_CORE
Upon Nutritional Assessment by the Registered Dietitian your patient was determined to meet criteria / has evidence of the following diagnosis/diagnoses:          [ ]  Mild Protein Calorie Malnutrition        [x ]  Moderate Protein Calorie Malnutrition        [ ] Severe Protein Calorie Malnutrition        [ ] Unspecified Protein Calorie Malnutrition        [ ] Underweight / BMI <19        [ ] Morbid Obesity / BMI > 40      Findings as based on:  •  Comprehensive nutrition assessment and consultation  •  Calorie counts (nutrient intake analysis)  •  Food acceptance and intake status from observations by staff  •  Follow up  •  Patient education  •  Intervention secondary to interdisciplinary rounds  •   concerns      Patient meets criteria for moderate protein-calorie malnutrition in context of chronic disease.   NFPE reveals moderate muscle wasting (thighs, calves, scapula, clavicles, temples, interosseus,),   moderate fat wasting (triceps, ribs.)  Pt reports good PO intake, not sure of his wt hx    Treatment:    The following diet has been recommended:  Maintain current DASH diet   add MVI w minerals  daily weight  Record PO intake in EMR after each meal (nursing.)   Monitor PO intake, tolerance, labs and weight.       PROVIDER Section:     By signing this assessment you are acknowledging and agree with the diagnosis/diagnoses assigned by the Registered Dietitian    Comments:

## 2019-07-03 NOTE — DIETITIAN INITIAL EVALUATION ADULT. - ENERGY NEEDS
Ht.  69     "        Wt.    69    kg               BMI     22.4             IBW        kg               Pt is at    %  IBW Ht.  69     "        Wt.    69    kg               BMI     22.4             IBW    73    kg               Pt is at   94 %  IBW

## 2019-07-03 NOTE — DIETITIAN INITIAL EVALUATION ADULT. - OTHER INFO
No H&P for patient.  Pt on DASH diet.  Edema 1+ left and right foot.  Pt admitted for fever.  Diaz 21.  Skin intact.  Consult for bariatric patient.  Likely INCORRECT REASON FOR CONSULT. No H&P for patient.  Pt on DASH diet.  Edema 1+ left and right foot.  Pt admitted for fever.  Diaz 21.  Skin intact.  Consult for bariatric patient.  INCORRECT REASON FOR CONSULT.  However, pt appears to have some muscle wasting and fat wasting.   Pt reports good appetite at home, unknown wt hx.  Patient meets criteria for moderate protein-calorie malnutrition in context of chronic disease. NO GI issues.  No issues chew/swallow. No H&P for patient.  Pt on DASH diet.  Edema 1+ left and right foot.  Pt admitted for fever.  Diaz 21.  Skin intact.  Consult for bariatric patient.  INCORRECT REASON FOR CONSULT.  However, pt appears to have some muscle wasting and fat wasting.   Pt reports good appetite at home, unknown wt hx.  Patient meets criteria for moderate protein-calorie malnutrition in context of chronic disease. NO GI issues.  No issues chew/swallow.  Pt reports good PO intake at home, based on his dietary recall.

## 2019-07-03 NOTE — ED ADULT NURSE REASSESSMENT NOTE - NS ED NURSE REASSESS COMMENT FT1
Pt received alert and oriented x3 VSS afebrile. Pt resting comfortably and denies pain. Pt satting 95% on 3 liters nasal cannula. IVF infusing last of bolus. Plan of care reviewed-all needs addressed and safety maintained. Call bell in reach.

## 2019-07-03 NOTE — H&P ADULT - HISTORY OF PRESENT ILLNESS
78 y/o male with a PMHx of A fib on Vka, CAD, Empyema of Rt lung s/p decortication 2 decades ago, Lymphoma, MI, recurrent PNA, TIA  presents to the ED c/o fever (103.4). +weakness +cough +dark green  sputum. He usually walks 2 miles a day but for the past  week,  since he became sick, his exercise tolerance has decreased significantly. He states that prior to getting sick he visited his grand children who were sick.  PCP: Dr. Goldberg in [place].  +dyspnea        Past Medical History:  Atrial Fibrillation    CAD (coronary artery disease)    Empyema of lung    Hyperlipidemia    Lymphoma    Old MI (myocardial infarction)    Recurrent pneumonia    TIA (transient ischemic attack).    Past Surgical History:  Empyema of Lung    History of prostate biopsy    Stented Coronary Artery  Rt foot surgery    Family History:   both parent had "heart disease", cannot recall more    Social History:   no smoking, no Etoh            REVIEW OF SYSTEMS:    CONSTITUTIONAL: No weakness, No fevers or chills  ENT: No ear ache, No sorethroat  NECK: No pain, No stiffness  RESPIRATORY: No cough, No wheezing, No hemoptysis; No dyspnea  CARDIOVASCULAR: No chest pain, No palpitations  GASTROINTESTINAL: No abd pain, No nausea, No vomiting, No hematemesis, No diarrhea or constipation. No melena, No hematochezia.  GENITOURINARY: No dysuria, No  hematuria  NEUROLOGICAL: No diplopia, No paresthesia, No motor dysfunction  MUSCULOSKELETAL: No arthralgia, No myalgia  SKIN: No rashes, or lesions   PSYCH: no anxiety, no suicidal ideation    All other review of systems is negative unless indicated above    Vital Signs Last 24 Hrs  T(C): 36.5 (03 Jul 2019 05:54), Max: 37.5 (02 Jul 2019 21:57)  T(F): 97.7 (03 Jul 2019 05:54), Max: 99.5 (02 Jul 2019 21:57)  HR: 80 (03 Jul 2019 05:54) (68 - 93)  BP: 129/72 (03 Jul 2019 05:54) (105/66 - 129/72)  BP(mean): --  RR: 18 (03 Jul 2019 05:54) (18 - 20)  SpO2: 98% (03 Jul 2019 05:54) (89% - 98%)    PHYSICAL EXAM:    GENERAL: NAD, Well nourished  HEENT:  NC/AT, EOMI, PERRLA, No scleral icterus, Moist mucous membranes  NECK: Supple, No JVD  CNS:  Alert & Oriented X3, Motor Strength 5/5 B/L upper and lower extremities; DTRs 2+ intact   LUNG: Normal Breath sounds, Clear to auscultation bilaterally, No rales, No rhonchi, No wheezing  HEART: RRR; No murmurs, No rubs  ABDOMEN: +BS, ST/ND/NT  GENITOURINARY: Voiding, Bladder not distended  EXTREMITIES:  2+ Peripheral Pulses, No clubbing, No cyanosis, No tibial edema  MUSCULOSKELTAL: Joints normal ROM, No TTP, No effusion  VAGINAL: deferred  SKIN: no rashes  RECTAL: deferred, not indicated  BREAST: deferred                          13.4   6.02  )-----------( 184      ( 02 Jul 2019 22:10 )             39.9     07-02    135  |  98  |  33<H>  ----------------------------<  135<H>  3.7   |  31  |  1.46<H>    Ca    8.1<L>      02 Jul 2019 22:10    TPro  6.9  /  Alb  3.0<L>  /  TBili  2.7<H>  /  DBili  x   /  AST  73<H>  /  ALT  40  /  AlkPhos  80  07-02    Vancomycin levels:   Cultures:     MEDICATIONS  (STANDING):  ALBUTerol/ipratropium for Nebulization 3 milliLiter(s) Nebulizer every 6 hours  azithromycin   Tablet 500 milliGRAM(s) Oral once  cefTRIAXone   IVPB 1000 milliGRAM(s) IV Intermittent every 24 hours  finasteride 5 milliGRAM(s) Oral daily  furosemide    Tablet 20 milliGRAM(s) Oral daily  guaiFENesin  milliGRAM(s) Oral every 12 hours    MEDICATIONS  (PRN):  aluminum hydroxide/magnesium hydroxide/simethicone Suspension 30 milliLiter(s) Oral every 4 hours PRN Dyspepsia  ondansetron Injectable 4 milliGRAM(s) IV Push every 6 hours PRN Nausea  senna 2 Tablet(s) Oral at bedtime PRN Constipation      all labs reviewed  all imaging reviewed    CXray: blunting of L costophrenic angle    A/P:    1. Enterovirus Uri    2. r/o superimposed CAP:  Ceftriaxone/Zithromax day#1  Blood Cx  Albut/Atrovent nebs, Mucinex    3. CAD:   restart ASA    4. Chronic diastolic dysfunction:  stable    5. Renal failure:   ? prerenal azotemia  will hold Lasix today  encourage oral fluids  recheck Cr in Am 78 y/o male with a PMHx of A fib on Vka, CAD, Empyema of Rt lung s/p decortication 2 decades ago, Lymphoma, MI, recurrent PNA, TIA  presents to the ED c/o fever (103.4). +weakness +cough +dark green  sputum. He usually walks 2 miles a day but for the past  week,  since he became sick, his exercise tolerance has decreased significantly. He states that prior to getting sick he visited his grand children who were sick.  PCP: Dr. Goldberg in [place].  +dyspnea        Past Medical History:  Atrial Fibrillation    CAD (coronary artery disease)    Empyema of lung    Hyperlipidemia    Lymphoma    Old MI (myocardial infarction)    Recurrent pneumonia    TIA (transient ischemic attack).    Past Surgical History:  Empyema of Lung    History of prostate biopsy    Stented Coronary Artery  Rt foot surgery    Family History:   both parent had "heart disease", cannot recall more    Social History:   no smoking, no Etoh            REVIEW OF SYSTEMS:    CONSTITUTIONAL: No weakness, No fevers or chills  ENT: No ear ache, No sorethroat  NECK: No pain, No stiffness  RESPIRATORY: No cough, No wheezing, No hemoptysis; No dyspnea  CARDIOVASCULAR: No chest pain, No palpitations  GASTROINTESTINAL: No abd pain, No nausea, No vomiting, No hematemesis, No diarrhea or constipation. No melena, No hematochezia.  GENITOURINARY: No dysuria, No  hematuria  NEUROLOGICAL: No diplopia, No paresthesia, No motor dysfunction  MUSCULOSKELETAL: No arthralgia, No myalgia  SKIN: No rashes, or lesions   PSYCH: no anxiety, no suicidal ideation    All other review of systems is negative unless indicated above    Vital Signs Last 24 Hrs  T(C): 36.5 (03 Jul 2019 05:54), Max: 37.5 (02 Jul 2019 21:57)  T(F): 97.7 (03 Jul 2019 05:54), Max: 99.5 (02 Jul 2019 21:57)  HR: 80 (03 Jul 2019 05:54) (68 - 93)  BP: 129/72 (03 Jul 2019 05:54) (105/66 - 129/72)  BP(mean): --  RR: 18 (03 Jul 2019 05:54) (18 - 20)  SpO2: 98% (03 Jul 2019 05:54) (89% - 98%)    PHYSICAL EXAM:    GENERAL: NAD, Well nourished  HEENT:  NC/AT, EOMI, PERRLA, No scleral icterus, Moist mucous membranes  NECK: Supple, No JVD  CNS:  Alert & Oriented X3, Motor Strength 5/5 B/L upper and lower extremities; DTRs 2+ intact   LUNG: Normal Breath sounds, Clear to auscultation bilaterally, No rales, No rhonchi, No wheezing  HEART: irregular; No murmurs, No rubs  ABDOMEN: +BS, ST/ND/NT  GENITOURINARY: Voiding, Bladder not distended  EXTREMITIES:  2+ Peripheral Pulses, No clubbing, No cyanosis, No tibial edema  MUSCULOSKELTAL: Joints normal ROM, No TTP, No effusion  VAGINAL: deferred  SKIN: no rashes  RECTAL: deferred, not indicated  BREAST: deferred                          13.4   6.02  )-----------( 184      ( 02 Jul 2019 22:10 )             39.9     07-02    135  |  98  |  33<H>  ----------------------------<  135<H>  3.7   |  31  |  1.46<H>    Ca    8.1<L>      02 Jul 2019 22:10    TPro  6.9  /  Alb  3.0<L>  /  TBili  2.7<H>  /  DBili  x   /  AST  73<H>  /  ALT  40  /  AlkPhos  80  07-02    Vancomycin levels:   Cultures:     MEDICATIONS  (STANDING):  ALBUTerol/ipratropium for Nebulization 3 milliLiter(s) Nebulizer every 6 hours  azithromycin   Tablet 500 milliGRAM(s) Oral once  cefTRIAXone   IVPB 1000 milliGRAM(s) IV Intermittent every 24 hours  finasteride 5 milliGRAM(s) Oral daily  furosemide    Tablet 20 milliGRAM(s) Oral daily  guaiFENesin  milliGRAM(s) Oral every 12 hours    MEDICATIONS  (PRN):  aluminum hydroxide/magnesium hydroxide/simethicone Suspension 30 milliLiter(s) Oral every 4 hours PRN Dyspepsia  ondansetron Injectable 4 milliGRAM(s) IV Push every 6 hours PRN Nausea  senna 2 Tablet(s) Oral at bedtime PRN Constipation      all labs reviewed  all imaging reviewed    CXray: blunting of L costophrenic angle    A/P:    1. Enterovirus Uri    2. r/o superimposed CAP:  Ceftriaxone/Zithromax day#1  Blood Cx  Albut/Atrovent nebs, Mucinex    3. CAD:   restart ASA    4. Chronic diastolic dysfunction:  stable    5. Renal failure:   ? prerenal azotemia  will hold Lasix today  encourage oral fluids  recheck Cr in Am    6. Afib: rate controlled  restart Coumadin for INR<3 80 y/o male with a PMHx of A fib on Vka, CAD, Empyema of Rt lung s/p decortication 2 decades ago, Lymphoma, MI, recurrent PNA, TIA  presents to the ED c/o fever (103.4). +weakness +cough +dark green  sputum. He usually walks 2 miles a day but for the past  week,  since he became sick, his exercise tolerance has decreased significantly. He states that prior to getting sick he visited his grand children who were sick.  PCP: Dr. Goldberg in [place].  +dyspnea        Past Medical History:  Atrial Fibrillation    CAD (coronary artery disease)    Empyema of lung    Hyperlipidemia    Lymphoma    Old MI (myocardial infarction)    Recurrent pneumonia    TIA (transient ischemic attack).    Past Surgical History:  Empyema of Lung    History of prostate biopsy    Stented Coronary Artery  Rt foot surgery    Family History:   both parent had "heart disease", cannot recall more    Social History:   no smoking, no Etoh            REVIEW OF SYSTEMS:    CONSTITUTIONAL: No weakness, No fevers or chills  ENT: No ear ache, No sorethroat  NECK: No pain, No stiffness  RESPIRATORY: No cough, No wheezing, No hemoptysis; No dyspnea  CARDIOVASCULAR: No chest pain, No palpitations  GASTROINTESTINAL: No abd pain, No nausea, No vomiting, No hematemesis, No diarrhea or constipation. No melena, No hematochezia.  GENITOURINARY: No dysuria, No  hematuria  NEUROLOGICAL: No diplopia, No paresthesia, No motor dysfunction  MUSCULOSKELETAL: No arthralgia, No myalgia  SKIN: No rashes, or lesions   PSYCH: no anxiety, no suicidal ideation    All other review of systems is negative unless indicated above    Vital Signs Last 24 Hrs  T(C): 36.5 (03 Jul 2019 05:54), Max: 37.5 (02 Jul 2019 21:57)  T(F): 97.7 (03 Jul 2019 05:54), Max: 99.5 (02 Jul 2019 21:57)  HR: 80 (03 Jul 2019 05:54) (68 - 93)  BP: 129/72 (03 Jul 2019 05:54) (105/66 - 129/72)  BP(mean): --  RR: 18 (03 Jul 2019 05:54) (18 - 20)  SpO2: 98% (03 Jul 2019 05:54) (89% - 98%)    PHYSICAL EXAM:    GENERAL: NAD, Well nourished  HEENT:  NC/AT, EOMI, PERRLA, No scleral icterus, Moist mucous membranes  NECK: Supple, No JVD  CNS:  Alert & Oriented X3, Motor Strength 5/5 B/L upper and lower extremities; DTRs 2+ intact   LUNG: Normal Breath sounds, Clear to auscultation bilaterally, No rales, No rhonchi, No wheezing  HEART: irregular; No murmurs, No rubs  ABDOMEN: +BS, ST/ND/NT  GENITOURINARY: Voiding, Bladder not distended  EXTREMITIES:  2+ Peripheral Pulses, No clubbing, No cyanosis, No tibial edema  MUSCULOSKELTAL: Joints normal ROM, No TTP, No effusion  VAGINAL: deferred  SKIN: no rashes  RECTAL: deferred, not indicated  BREAST: deferred                          13.4   6.02  )-----------( 184      ( 02 Jul 2019 22:10 )             39.9     07-02    135  |  98  |  33<H>  ----------------------------<  135<H>  3.7   |  31  |  1.46<H>    Ca    8.1<L>      02 Jul 2019 22:10    TPro  6.9  /  Alb  3.0<L>  /  TBili  2.7<H>  /  DBili  x   /  AST  73<H>  /  ALT  40  /  AlkPhos  80  07-02    Vancomycin levels:   Cultures:     MEDICATIONS  (STANDING):  ALBUTerol/ipratropium for Nebulization 3 milliLiter(s) Nebulizer every 6 hours  azithromycin   Tablet 500 milliGRAM(s) Oral once  cefTRIAXone   IVPB 1000 milliGRAM(s) IV Intermittent every 24 hours  finasteride 5 milliGRAM(s) Oral daily  furosemide    Tablet 20 milliGRAM(s) Oral daily  guaiFENesin  milliGRAM(s) Oral every 12 hours    MEDICATIONS  (PRN):  aluminum hydroxide/magnesium hydroxide/simethicone Suspension 30 milliLiter(s) Oral every 4 hours PRN Dyspepsia  ondansetron Injectable 4 milliGRAM(s) IV Push every 6 hours PRN Nausea  senna 2 Tablet(s) Oral at bedtime PRN Constipation      all labs reviewed  all imaging reviewed    CXray: blunting of L costophrenic angle    A/P:    1. Enterovirus Uri    2. r/o superimposed CAP:  Ceftriaxone/Zithromax day#1  Blood Cx  Albut/Atrovent nebs, Mucinex    3. CAD:   restart ASA    4. Chronic diastolic dysfunction:  stable    5. Renal failure:   ? prerenal azotemia  will hold Lasix today  encourage oral fluids  recheck Cr in Am    6. Afib: rate controlled  restart Coumadin for INR<3    7. Moderate Protein Calorie Malnutrition

## 2019-07-03 NOTE — DIETITIAN INITIAL EVALUATION ADULT. - MALNUTRITION
Patient meets criteria for moderate protein-calorie malnutrition in context of chronic disease.  NFPE reveals moderate muscle wasting (thighs, calves, scapula, clavicles, temples, interosseus,), moderate fat wasting (triceps, ribs.) Patient meets criteria for moderate protein-calorie malnutrition in context of chronic disease

## 2019-07-03 NOTE — DIETITIAN INITIAL EVALUATION ADULT. - PERTINENT LABORATORY DATA
07-02 Na135 mmol/L Glu 135 mg/dL<H> K+ 3.7 mmol/L Cr  1.46 mg/dL<H> BUN 33 mg/dL<H> Phos n/a   Alb 3.0 g/dL<L> PAB n/a

## 2019-07-04 LAB
ANION GAP SERPL CALC-SCNC: 8 MMOL/L — SIGNIFICANT CHANGE UP (ref 5–17)
BUN SERPL-MCNC: 20 MG/DL — SIGNIFICANT CHANGE UP (ref 7–23)
CALCIUM SERPL-MCNC: 8 MG/DL — LOW (ref 8.5–10.1)
CHLORIDE SERPL-SCNC: 99 MMOL/L — SIGNIFICANT CHANGE UP (ref 96–108)
CO2 SERPL-SCNC: 27 MMOL/L — SIGNIFICANT CHANGE UP (ref 22–31)
CREAT SERPL-MCNC: 0.99 MG/DL — SIGNIFICANT CHANGE UP (ref 0.5–1.3)
CULTURE RESULTS: NO GROWTH — SIGNIFICANT CHANGE UP
GLUCOSE SERPL-MCNC: 94 MG/DL — SIGNIFICANT CHANGE UP (ref 70–99)
INR BLD: 3.19 RATIO — HIGH (ref 0.88–1.16)
POTASSIUM SERPL-MCNC: 3.7 MMOL/L — SIGNIFICANT CHANGE UP (ref 3.5–5.3)
POTASSIUM SERPL-SCNC: 3.7 MMOL/L — SIGNIFICANT CHANGE UP (ref 3.5–5.3)
PROTHROM AB SERPL-ACNC: 36.7 SEC — HIGH (ref 10–12.9)
SODIUM SERPL-SCNC: 134 MMOL/L — LOW (ref 135–145)
SPECIMEN SOURCE: SIGNIFICANT CHANGE UP

## 2019-07-04 RX ORDER — FUROSEMIDE 40 MG
20 TABLET ORAL DAILY
Refills: 0 | Status: DISCONTINUED | OUTPATIENT
Start: 2019-07-04 | End: 2019-07-05

## 2019-07-04 RX ADMIN — Medication 600 MILLIGRAM(S): at 17:13

## 2019-07-04 RX ADMIN — Medication 3 MILLILITER(S): at 20:11

## 2019-07-04 RX ADMIN — FINASTERIDE 5 MILLIGRAM(S): 5 TABLET, FILM COATED ORAL at 11:31

## 2019-07-04 RX ADMIN — Medication 20 MILLIGRAM(S): at 11:51

## 2019-07-04 RX ADMIN — ATORVASTATIN CALCIUM 20 MILLIGRAM(S): 80 TABLET, FILM COATED ORAL at 21:50

## 2019-07-04 RX ADMIN — Medication 1 DROP(S): at 05:15

## 2019-07-04 RX ADMIN — Medication 600 MILLIGRAM(S): at 05:15

## 2019-07-04 RX ADMIN — AZITHROMYCIN 250 MILLIGRAM(S): 500 TABLET, FILM COATED ORAL at 11:31

## 2019-07-04 RX ADMIN — Medication 3 MILLILITER(S): at 14:56

## 2019-07-04 RX ADMIN — CEFTRIAXONE 1000 MILLIGRAM(S): 500 INJECTION, POWDER, FOR SOLUTION INTRAMUSCULAR; INTRAVENOUS at 21:51

## 2019-07-04 RX ADMIN — TAMSULOSIN HYDROCHLORIDE 0.4 MILLIGRAM(S): 0.4 CAPSULE ORAL at 21:50

## 2019-07-04 RX ADMIN — Medication 3 MILLILITER(S): at 08:23

## 2019-07-04 NOTE — PROGRESS NOTE ADULT - SUBJECTIVE AND OBJECTIVE BOX
CC:  Patient is a 79y old  Male who presents with a chief complaint of   SUBJECTIVE:     -no new complaints or issues at current time.    ROS:  all other review of systems are negative unless indicated above.    ALBUTerol/ipratropium for Nebulization 3 milliLiter(s) Nebulizer every 6 hours  aluminum hydroxide/magnesium hydroxide/simethicone Suspension 30 milliLiter(s) Oral every 4 hours PRN  artificial  tears Solution 1 Drop(s) Both EYES two times a day  aspirin  chewable 81 milliGRAM(s) Oral daily  atorvastatin 20 milliGRAM(s) Oral at bedtime  azithromycin   Tablet 250 milliGRAM(s) Oral daily  cefTRIAXone Injectable. 1000 milliGRAM(s) IV Push every 24 hours  finasteride 5 milliGRAM(s) Oral daily  guaiFENesin  milliGRAM(s) Oral every 12 hours  ondansetron Injectable 4 milliGRAM(s) IV Push every 6 hours PRN  senna 2 Tablet(s) Oral at bedtime PRN  tamsulosin 0.4 milliGRAM(s) Oral at bedtime  warfarin 2 milliGRAM(s) Oral daily    T(C): 36.9 (07-04-19 @ 04:40), Max: 36.9 (07-04-19 @ 04:40)  HR: 96 (07-04-19 @ 04:40) (69 - 96)  BP: 136/80 (07-04-19 @ 04:40) (108/64 - 136/80)  RR: 18 (07-04-19 @ 04:40) (18 - 18)  SpO2: 95% (07-04-19 @ 04:40) (95% - 97%)    Constitutional: NAD.   Neck:  JVP wnl.  supple.  Respiratory: Breath sounds are clear bilaterally, No wheezing, rales or rhonchi  Cardiovascular: S1 and S2, regular rate and rhythm, no murmur, rub or gallop.  Gastrointestinal: Bowel Sounds present, soft, nontender, nondistended, no guarding, no rebound, no mass.  Extremities: No peripheral edema  Neurological: A/O x                            13.4   6.02  )-----------( 184      ( 02 Jul 2019 22:10 )             39.9     PT/INR - ( 03 Jul 2019 19:52 )   PT: 44.9 sec;   INR: 3.88 ratio         PTT - ( 02 Jul 2019 22:10 )  PTT:38.6 sec  07-02    135  |  98  |  33<H>  ----------------------------<  135<H>  3.7   |  31  |  1.46<H>    Ca    8.1<L>      02 Jul 2019 22:10    TPro  6.9  /  Alb  3.0<L>  /  TBili  2.7<H>  /  DBili  x   /  AST  73<H>  /  ALT  40  /  AlkPhos  80  07-02 CC:  Patient is a 79y old  Male who presents with a chief complaint of   SUBJECTIVE:     -O2 via NC.    ROS:  all other review of systems are negative unless indicated above.    ALBUTerol/ipratropium for Nebulization 3 milliLiter(s) Nebulizer every 6 hours  aluminum hydroxide/magnesium hydroxide/simethicone Suspension 30 milliLiter(s) Oral every 4 hours PRN  artificial  tears Solution 1 Drop(s) Both EYES two times a day  aspirin  chewable 81 milliGRAM(s) Oral daily  atorvastatin 20 milliGRAM(s) Oral at bedtime  azithromycin   Tablet 250 milliGRAM(s) Oral daily  cefTRIAXone Injectable. 1000 milliGRAM(s) IV Push every 24 hours  finasteride 5 milliGRAM(s) Oral daily  guaiFENesin  milliGRAM(s) Oral every 12 hours  ondansetron Injectable 4 milliGRAM(s) IV Push every 6 hours PRN  senna 2 Tablet(s) Oral at bedtime PRN  tamsulosin 0.4 milliGRAM(s) Oral at bedtime  warfarin 2 milliGRAM(s) Oral daily    T(C): 36.9 (07-04-19 @ 04:40), Max: 36.9 (07-04-19 @ 04:40)  HR: 96 (07-04-19 @ 04:40) (69 - 96)  BP: 136/80 (07-04-19 @ 04:40) (108/64 - 136/80)  RR: 18 (07-04-19 @ 04:40) (18 - 18)  SpO2: 95% (07-04-19 @ 04:40) (95% - 97%)    Constitutional: NAD.   Neck:  JVP wnl.  supple.  Respiratory: Breath sounds are clear bilaterally, No wheezing, rales or rhonchi  Cardiovascular: S1 and S2, regular rate and rhythm, no murmur, rub or gallop.  Gastrointestinal: Bowel Sounds present, soft, nontender, nondistended, no guarding, no rebound, no mass.  Extremities: No peripheral edema  Neurological: A/O x                            13.4   6.02  )-----------( 184      ( 02 Jul 2019 22:10 )             39.9     PT/INR - ( 03 Jul 2019 19:52 )   PT: 44.9 sec;   INR: 3.88 ratio         PTT - ( 02 Jul 2019 22:10 )  PTT:38.6 sec  07-02    135  |  98  |  33<H>  ----------------------------<  135<H>  3.7   |  31  |  1.46<H>    Ca    8.1<L>      02 Jul 2019 22:10    TPro  6.9  /  Alb  3.0<L>  /  TBili  2.7<H>  /  DBili  x   /  AST  73<H>  /  ALT  40  /  AlkPhos  80  07-02

## 2019-07-05 ENCOUNTER — TRANSCRIPTION ENCOUNTER (OUTPATIENT)
Age: 79
End: 2019-07-05

## 2019-07-05 VITALS
RESPIRATION RATE: 18 BRPM | HEART RATE: 95 BPM | TEMPERATURE: 99 F | OXYGEN SATURATION: 98 % | DIASTOLIC BLOOD PRESSURE: 85 MMHG | SYSTOLIC BLOOD PRESSURE: 128 MMHG

## 2019-07-05 LAB
ANION GAP SERPL CALC-SCNC: 5 MMOL/L — SIGNIFICANT CHANGE UP (ref 5–17)
BUN SERPL-MCNC: 18 MG/DL — SIGNIFICANT CHANGE UP (ref 7–23)
CALCIUM SERPL-MCNC: 8.2 MG/DL — LOW (ref 8.5–10.1)
CHLORIDE SERPL-SCNC: 101 MMOL/L — SIGNIFICANT CHANGE UP (ref 96–108)
CO2 SERPL-SCNC: 31 MMOL/L — SIGNIFICANT CHANGE UP (ref 22–31)
CREAT SERPL-MCNC: 1 MG/DL — SIGNIFICANT CHANGE UP (ref 0.5–1.3)
GLUCOSE SERPL-MCNC: 96 MG/DL — SIGNIFICANT CHANGE UP (ref 70–99)
HCT VFR BLD CALC: 35.2 % — LOW (ref 39–50)
HGB BLD-MCNC: 11.6 G/DL — LOW (ref 13–17)
INR BLD: 2.73 RATIO — HIGH (ref 0.88–1.16)
MCHC RBC-ENTMCNC: 32.3 PG — SIGNIFICANT CHANGE UP (ref 27–34)
MCHC RBC-ENTMCNC: 33 GM/DL — SIGNIFICANT CHANGE UP (ref 32–36)
MCV RBC AUTO: 98.1 FL — SIGNIFICANT CHANGE UP (ref 80–100)
PLATELET # BLD AUTO: 205 K/UL — SIGNIFICANT CHANGE UP (ref 150–400)
POTASSIUM SERPL-MCNC: 3.9 MMOL/L — SIGNIFICANT CHANGE UP (ref 3.5–5.3)
POTASSIUM SERPL-SCNC: 3.9 MMOL/L — SIGNIFICANT CHANGE UP (ref 3.5–5.3)
PROTHROM AB SERPL-ACNC: 31.2 SEC — HIGH (ref 10–12.9)
RBC # BLD: 3.59 M/UL — LOW (ref 4.2–5.8)
RBC # FLD: 12.7 % — SIGNIFICANT CHANGE UP (ref 10.3–14.5)
SODIUM SERPL-SCNC: 137 MMOL/L — SIGNIFICANT CHANGE UP (ref 135–145)
WBC # BLD: 5.27 K/UL — SIGNIFICANT CHANGE UP (ref 3.8–10.5)
WBC # FLD AUTO: 5.27 K/UL — SIGNIFICANT CHANGE UP (ref 3.8–10.5)

## 2019-07-05 RX ORDER — CEFUROXIME AXETIL 250 MG
1 TABLET ORAL
Qty: 8 | Refills: 0
Start: 2019-07-05 | End: 2019-07-08

## 2019-07-05 RX ADMIN — AZITHROMYCIN 250 MILLIGRAM(S): 500 TABLET, FILM COATED ORAL at 09:03

## 2019-07-05 RX ADMIN — Medication 3 MILLILITER(S): at 02:17

## 2019-07-05 RX ADMIN — FINASTERIDE 5 MILLIGRAM(S): 5 TABLET, FILM COATED ORAL at 09:03

## 2019-07-05 RX ADMIN — Medication 600 MILLIGRAM(S): at 05:45

## 2019-07-05 RX ADMIN — Medication 3 MILLILITER(S): at 08:22

## 2019-07-05 RX ADMIN — Medication 20 MILLIGRAM(S): at 09:03

## 2019-07-05 RX ADMIN — Medication 1 DROP(S): at 06:38

## 2019-07-05 NOTE — DISCHARGE NOTE NURSING/CASE MANAGEMENT/SOCIAL WORK - NSDCDPATPORTLINK_GEN_ALL_CORE
You can access the [facility] Patient Portal, offered by [facility], by registering with the following website: [email or web address]

## 2019-07-05 NOTE — DISCHARGE NOTE PROVIDER - HOSPITAL COURSE
Impression:    79M.  admitted 07/02/2019.  presented to ED c/o fever 103.4.  a/w weakness, cough and sputum.        PMHx:  CAD+AMI-PCI.;  HFpEF;  AF (w);  TIA;  HLD;  empyema;  lymphoma;  BPH.            Assessment:    1.Enterovirus URI.    2.CAP.    3.MINAL-resolved.    4.hx CAD-stable.    5.hx HFpEF-compensated.    6.hx AF-rate controlled.        Plan:    -BCx, no growth.    -Ceftin 500mg po bid x 4 more days (total 7).    -azithromycin 250mg po qd x 2 more days (total 5).    -may resume Lasix.  trend BMP outpatient.    -INR therapeutic.  resume warfarin.    -c/w chronic medical issue management.    -may discharge home today.    -advised if symptoms change or worsen to f/u w/ PMD, urgent care or ED for reevaluation.    -d/w RN and daughter (Gastroenterologist).            Electronic Signatures:    D'Amico, Thomas J (DO)  (Signed 05-Jul-2019 09:46)    	Authored: Progress Note, Reason for Admission, Subjective and Objective, Assessment and Plan

## 2019-07-05 NOTE — PROGRESS NOTE ADULT - SUBJECTIVE AND OBJECTIVE BOX
CC:  Patient is a 79y old  Male who presents with a chief complaint of Patient is a 79y old  Male who presents with a chief complaint of (04 Jul 2019 07:55)    SUBJECTIVE: daughter at bedside.    -doing better.  -patient reports breathing has improved since admission.  -minimal cough.  -denied ANDERSON.  -room air SpO2 98%.    ROS:  all other review of systems are negative unless indicated above.    ALBUTerol/ipratropium for Nebulization 3 milliLiter(s) Nebulizer every 6 hours  aluminum hydroxide/magnesium hydroxide/simethicone Suspension 30 milliLiter(s) Oral every 4 hours PRN  artificial  tears Solution 1 Drop(s) Both EYES two times a day  aspirin  chewable 81 milliGRAM(s) Oral daily  atorvastatin 20 milliGRAM(s) Oral at bedtime  azithromycin   Tablet 250 milliGRAM(s) Oral daily  cefTRIAXone Injectable. 1000 milliGRAM(s) IV Push every 24 hours  finasteride 5 milliGRAM(s) Oral daily  furosemide    Tablet 20 milliGRAM(s) Oral daily  guaiFENesin  milliGRAM(s) Oral every 12 hours  ondansetron Injectable 4 milliGRAM(s) IV Push every 6 hours PRN  senna 2 Tablet(s) Oral at bedtime PRN  tamsulosin 0.4 milliGRAM(s) Oral at bedtime  warfarin 2 milliGRAM(s) Oral daily    T(C): 37 (07-05-19 @ 05:19), Max: 37.2 (07-04-19 @ 17:36)  HR: 95 (07-05-19 @ 05:19) (78 - 95)  BP: 128/85 (07-05-19 @ 05:19) (128/85 - 132/86)  RR: 18 (07-05-19 @ 05:19) (18 - 19)  SpO2: 98% (07-05-19 @ 05:19) (92% - 98%)    Constitutional: NAD.   Neck:  JVP wnl.  supple.  Respiratory: CLEAR LUNGS.  Cardiovascular: S1 and S2, regular rate and rhythm, no murmur, rub or gallop.  Gastrointestinal: Bowel Sounds present, soft, nontender, nondistended, no guarding, no rebound, no mass.  Extremities: No peripheral edema  Neurological: A/O x  3.                          11.6   5.27  )-----------( 205      ( 05 Jul 2019 08:14 )             35.2     PT/INR - ( 05 Jul 2019 08:14 )   PT: 31.2 sec;   INR: 2.73 ratio           07-05    137  |  101  |  18  ----------------------------<  96  3.9   |  31  |  1.00    Ca    8.2<L>      05 Jul 2019 08:14    < from: Xray Chest 1 View-PORTABLE IMMEDIATE (07.02.19 @ 23:00) >  PROCEDURE DATE:  07/02/2019          INTERPRETATION:  Clinical information: Sepsis    AP chest radiograph from [id number]    COMPARISON: February 25, 2019    FINDINGS: Cardiac and mediastinal contours are stable although not well   evaluated due to technique.    There is mild retrocardiac opacity. The right lung is clear. There is no   pneumothorax or pleural effusion.    IMPRESSION:    Mild retrocardiac opacity could be on the basis of atelectasis or   pneumonia.    < end of copied text >

## 2019-07-05 NOTE — DISCHARGE NOTE PROVIDER - NSDCCPCAREPLAN_GEN_ALL_CORE_FT
PRINCIPAL DISCHARGE DIAGNOSIS  Diagnosis: Cough  Assessment and Plan of Treatment:       SECONDARY DISCHARGE DIAGNOSES  Diagnosis: Hypoxia  Assessment and Plan of Treatment:     Diagnosis: Fever  Assessment and Plan of Treatment:

## 2019-07-05 NOTE — PROGRESS NOTE ADULT - ASSESSMENT
Impression:  79M.  admitted 07/02/2019.  presented to ED c/o fever 103.4.  a/w weakness, cough and sputum.    PMHx:  CAD+AMI-PCI.;  HFpEF;  AF (w);  TIA;  HLD;  empyema;  lymphoma;  BPH.        Assessment:  1.	Enterovirus URI.  2.	CAP.  3.	MINAL.  4.	hx CAD-stable.  5.	hx HFpEF-compensated.  6.	hx AF-rate controlled.    Plan:  -f/u BCx.  ABx.  DUO neb.  Mucinex.  -hold Lasix.  trend BMP.  -INR > 3, hold warfarin.  -c/w chronic medical issue management.  -DVT prophylaxis.  -discussed w/ RN
Impression:  79M.  admitted 07/02/2019.  presented to ED c/o fever 103.4.  a/w weakness, cough and sputum.    PMHx:  CAD+AMI-PCI.;  HFpEF;  AF (w);  TIA;  HLD;  empyema;  lymphoma;  BPH.        Assessment:  1.	Enterovirus URI.  2.	CAP.  3.	MINAL-resolved.  4.	hx CAD-stable.  5.	hx HFpEF-compensated.  6.	hx AF-rate controlled.    Plan:  -BCx, no growth.  -Ceftin 500mg po bid x 4 more days (total 7).  -azithromycin 250mg po qd x 2 more days (total 5).  -may resume Lasix.  trend BMP outpatient.  -INR therapeutic.  resume warfarin.  -c/w chronic medical issue management.  -may discharge home today.  -advised if symptoms change or worsen to f/u w/ PMD, urgent care or ED for reevaluation.  -d/w RN and daughter (Gastroenterologist).

## 2019-07-05 NOTE — PROGRESS NOTE ADULT - REASON FOR ADMISSION
Patient is a 79y old  Male who presents with a chief complaint of
Patient is a 79y old  Male who presents with a chief complaint of

## 2019-07-06 RX ORDER — AZITHROMYCIN 500 MG/1
1 TABLET, FILM COATED ORAL
Qty: 2 | Refills: 0
Start: 2019-07-06 | End: 2019-07-07

## 2019-07-08 LAB
CULTURE RESULTS: SIGNIFICANT CHANGE UP
CULTURE RESULTS: SIGNIFICANT CHANGE UP
SPECIMEN SOURCE: SIGNIFICANT CHANGE UP
SPECIMEN SOURCE: SIGNIFICANT CHANGE UP

## 2019-07-09 DIAGNOSIS — Z86.73 PERSONAL HISTORY OF TRANSIENT ISCHEMIC ATTACK (TIA), AND CEREBRAL INFARCTION WITHOUT RESIDUAL DEFICITS: ICD-10-CM

## 2019-07-09 DIAGNOSIS — N17.9 ACUTE KIDNEY FAILURE, UNSPECIFIED: ICD-10-CM

## 2019-07-09 DIAGNOSIS — Z95.5 PRESENCE OF CORONARY ANGIOPLASTY IMPLANT AND GRAFT: ICD-10-CM

## 2019-07-09 DIAGNOSIS — I48.91 UNSPECIFIED ATRIAL FIBRILLATION: ICD-10-CM

## 2019-07-09 DIAGNOSIS — I50.32 CHRONIC DIASTOLIC (CONGESTIVE) HEART FAILURE: ICD-10-CM

## 2019-07-09 DIAGNOSIS — Z88.2 ALLERGY STATUS TO SULFONAMIDES: ICD-10-CM

## 2019-07-09 DIAGNOSIS — E44.0 MODERATE PROTEIN-CALORIE MALNUTRITION: ICD-10-CM

## 2019-07-09 DIAGNOSIS — Z87.01 PERSONAL HISTORY OF PNEUMONIA (RECURRENT): ICD-10-CM

## 2019-07-09 DIAGNOSIS — J06.9 ACUTE UPPER RESPIRATORY INFECTION, UNSPECIFIED: ICD-10-CM

## 2019-07-09 DIAGNOSIS — B97.10 UNSPECIFIED ENTEROVIRUS AS THE CAUSE OF DISEASES CLASSIFIED ELSEWHERE: ICD-10-CM

## 2019-07-09 DIAGNOSIS — E78.5 HYPERLIPIDEMIA, UNSPECIFIED: ICD-10-CM

## 2019-07-09 DIAGNOSIS — R09.02 HYPOXEMIA: ICD-10-CM

## 2019-07-09 DIAGNOSIS — I25.2 OLD MYOCARDIAL INFARCTION: ICD-10-CM

## 2019-07-09 DIAGNOSIS — J18.9 PNEUMONIA, UNSPECIFIED ORGANISM: ICD-10-CM

## 2019-07-09 DIAGNOSIS — Z85.72 PERSONAL HISTORY OF NON-HODGKIN LYMPHOMAS: ICD-10-CM

## 2019-07-09 DIAGNOSIS — I25.10 ATHEROSCLEROTIC HEART DISEASE OF NATIVE CORONARY ARTERY WITHOUT ANGINA PECTORIS: ICD-10-CM

## 2019-07-09 DIAGNOSIS — N40.0 BENIGN PROSTATIC HYPERPLASIA WITHOUT LOWER URINARY TRACT SYMPTOMS: ICD-10-CM

## 2020-06-10 NOTE — ED ADULT NURSE NOTE - TEMPLATE LIST FOR HEAD TO TOE ASSESSMENT
Patient:   DILSHAD DUFF            MRN: [id number]            FIN: [id number]              Age:   50 years     Sex:  FEMALE     :  [birth year]   Associated Diagnoses:   None   Author:   JEAN DONAHUE     History of Present Illness   reports improved dyspnea     Physical Examination   VS/Measurements     Vitals between:   2020 12:56:53   TO   10-VIANEY-2020 12:56:53                   LAST RESULT MINIMUM MAXIMUM  Temperature 37.5 36.9 38.0  Heart Rate 131 121 132  Respiratory Rate 20 16 26  NISBP           125 121 173  NIDBP           85 80 98  NIMBP           101 101 119  SpO2                    94 94 99  , Measurements from flowsheet : Height and Weight   20 20:00 CDT CLINICALWEIGHT 83.8 kg    Weight Method Measured    MEDDOSEWT 83.8 kg    CLINICALHEIGHT 150 cm    Height Method Stated    BSA - Medical History 1.78    BMI-Medical History 37.2 kg/m2   20 12:37 CDT CLINICALWEIGHT 84 kg    Weight Method Stated    MEDDOSEWT 84 kg       General: No apparent distress  CV: RRR, normal s1/s2, no murmurs  Respiratory: CTAB, no crackles or wheezes  Abdomen: soft, non-tender, non-distended  Skin: warm, dry, no edema        Review / Management   Laboratory results:     Labs between:  2020 12:56 to 10-VIANEY-2020 12:56  CBC:                 WBC  HgB  Hct  Plt  MCV  RDW   10-VIANEY-2020 (H) 12.0  (L) 10.8  (L) 35.1  323  (L) 77.0  (H) 15.3  DIFF:                 Seg  Neutroph//ABS  Lymph//ABS  Mono//ABS  EOS/ABS  10-VIANEY-2020 NOT APPLICABLE  76 // (H) 9.1  12 // 1.4 10 // (H) 1.2  1 // 0.1  BMP:                 Na  Cl  BUN  Glu   10-VIANEY-2020 139  100  17  (H) 133                              K  CO2  Cr  Ca                              3.8  28  (H) 1.31  9.7   CMP:                 AST  ALT  AlkPhos  Bili  Albumin   10-VIANEY-2020 13  32  100  0.8  (L) 2.6                  .      Impression and Plan   1. dyspnea, left pleural effusion, suspect CHF, acute hypoxic respiratory failure due to pneumonia  obtain  echo  start iv lasix  IR consult for thoracentesis  covid pcr pending  fluid consistent with pneumonia, start IV rocephin  2. hypertension  obtain echo  3. chronic bronchitis  appears stable  4. elevated d dimer  CT PE negative for PE  Full code status as discussed with patient and/or family.  DVT prophylaxis: scd  PCP:  Luis Felipe   General

## 2020-10-20 ENCOUNTER — APPOINTMENT (OUTPATIENT)
Dept: OPHTHALMOLOGY | Facility: CLINIC | Age: 80
End: 2020-10-20
Payer: MEDICARE

## 2020-10-20 ENCOUNTER — NON-APPOINTMENT (OUTPATIENT)
Age: 80
End: 2020-10-20

## 2020-10-20 PROCEDURE — 92012 INTRM OPH EXAM EST PATIENT: CPT

## 2022-02-07 ENCOUNTER — NON-APPOINTMENT (OUTPATIENT)
Age: 82
End: 2022-02-07

## 2022-02-07 ENCOUNTER — APPOINTMENT (OUTPATIENT)
Dept: OPHTHALMOLOGY | Facility: CLINIC | Age: 82
End: 2022-02-07
Payer: MEDICARE

## 2022-02-07 PROCEDURE — 92012 INTRM OPH EXAM EST PATIENT: CPT

## 2022-03-21 ENCOUNTER — APPOINTMENT (OUTPATIENT)
Dept: OPHTHALMOLOGY | Facility: CLINIC | Age: 82
End: 2022-03-21
Payer: MEDICARE

## 2022-03-21 ENCOUNTER — NON-APPOINTMENT (OUTPATIENT)
Age: 82
End: 2022-03-21

## 2022-03-21 PROCEDURE — 92015 DETERMINE REFRACTIVE STATE: CPT

## 2022-04-27 ENCOUNTER — APPOINTMENT (OUTPATIENT)
Dept: GASTROENTEROLOGY | Facility: CLINIC | Age: 82
End: 2022-04-27
Payer: MEDICARE

## 2022-04-27 VITALS
HEART RATE: 54 BPM | BODY MASS INDEX: 24.34 KG/M2 | SYSTOLIC BLOOD PRESSURE: 167 MMHG | RESPIRATION RATE: 16 BRPM | HEIGHT: 70 IN | DIASTOLIC BLOOD PRESSURE: 92 MMHG | WEIGHT: 170 LBS

## 2022-04-27 DIAGNOSIS — R10.31 RIGHT LOWER QUADRANT PAIN: ICD-10-CM

## 2022-04-27 PROCEDURE — 99203 OFFICE O/P NEW LOW 30 MIN: CPT

## 2022-05-01 PROBLEM — R10.31 INTERMITTENT RIGHT LOWER QUADRANT ABDOMINAL PAIN: Status: ACTIVE | Noted: 2022-05-01

## 2022-05-01 NOTE — ASSESSMENT
[FreeTextEntry1] : 83yo male with altered, bowel habits, bloating rlq discomfort\par \par likely all due to relative constipation\par will try fiber gummies, increase fluid \par low fodmap diet\par simethicone

## 2022-05-01 NOTE — PHYSICAL EXAM
[General Appearance - Alert] : alert [General Appearance - In No Acute Distress] : in no acute distress [Auscultation Breath Sounds / Voice Sounds] : lungs were clear to auscultation bilaterally [Heart Rate And Rhythm] : heart rate was normal and rhythm regular [Heart Sounds] : normal S1 and S2 [Murmurs] : no murmurs [Heart Sounds Gallop] : no gallops [Heart Sounds Pericardial Friction Rub] : no pericardial rub [Bowel Sounds] : normal bowel sounds [Abdomen Soft] : soft [Abdomen Tenderness] : non-tender [] : no hepato-splenomegaly [Abdomen Mass (___ Cm)] : no abdominal mass palpated [Abnormal Walk] : normal gait [Nail Clubbing] : no clubbing  or cyanosis of the fingernails [Musculoskeletal - Swelling] : no joint swelling seen [Motor Tone] : muscle strength and tone were normal [Oriented To Time, Place, And Person] : oriented to person, place, and time [Impaired Insight] : insight and judgment were intact [Affect] : the affect was normal

## 2022-05-01 NOTE — HISTORY OF PRESENT ILLNESS
[de-identified] : 81yo male with bloating, some rlq discomfort\par \par He has bloating and some abdominal discomfort. More on right. Can be relieved with BM. \par He had evaluation with negative ct for appendicitis\par At times, gas and rare stool come out\par He urinates frequently due to prostate and lasix\par \par

## 2022-06-22 ENCOUNTER — APPOINTMENT (OUTPATIENT)
Dept: GASTROENTEROLOGY | Facility: CLINIC | Age: 82
End: 2022-06-22

## 2022-06-22 VITALS
BODY MASS INDEX: 24.39 KG/M2 | SYSTOLIC BLOOD PRESSURE: 142 MMHG | HEART RATE: 60 BPM | DIASTOLIC BLOOD PRESSURE: 89 MMHG | WEIGHT: 170 LBS

## 2022-06-22 DIAGNOSIS — R14.0 ABDOMINAL DISTENSION (GASEOUS): ICD-10-CM

## 2022-06-22 PROCEDURE — 99212 OFFICE O/P EST SF 10 MIN: CPT

## 2022-07-05 PROBLEM — R14.0 ABDOMINAL BLOATING: Status: ACTIVE | Noted: 2022-05-01

## 2022-07-05 NOTE — ASSESSMENT
[FreeTextEntry1] : 83yo male with gas and bloating\par \par Will try align\par Will try lower fodmap diet

## 2022-07-05 NOTE — HISTORY OF PRESENT ILLNESS
[de-identified] : 83yo male with increased gas and bloating\par \par He has noticed mild help with simethicone\par He is still getting bloating and increased gas, worse usually after eating\par

## 2022-10-11 ENCOUNTER — APPOINTMENT (OUTPATIENT)
Dept: GASTROENTEROLOGY | Facility: CLINIC | Age: 82
End: 2022-10-11

## 2022-10-11 ENCOUNTER — RESULT REVIEW (OUTPATIENT)
Age: 82
End: 2022-10-11

## 2022-10-11 VITALS
HEIGHT: 69 IN | SYSTOLIC BLOOD PRESSURE: 120 MMHG | WEIGHT: 162.2 LBS | HEART RATE: 88 BPM | BODY MASS INDEX: 24.02 KG/M2 | DIASTOLIC BLOOD PRESSURE: 86 MMHG

## 2022-10-11 DIAGNOSIS — R10.30 LOWER ABDOMINAL PAIN, UNSPECIFIED: ICD-10-CM

## 2022-10-11 PROCEDURE — 99213 OFFICE O/P EST LOW 20 MIN: CPT

## 2022-10-13 ENCOUNTER — OUTPATIENT (OUTPATIENT)
Dept: OUTPATIENT SERVICES | Facility: HOSPITAL | Age: 82
LOS: 1 days | End: 2022-10-13
Payer: MEDICARE

## 2022-10-13 ENCOUNTER — APPOINTMENT (OUTPATIENT)
Dept: CT IMAGING | Facility: CLINIC | Age: 82
End: 2022-10-13

## 2022-10-13 DIAGNOSIS — R10.30 LOWER ABDOMINAL PAIN, UNSPECIFIED: ICD-10-CM

## 2022-10-13 DIAGNOSIS — R14.0 ABDOMINAL DISTENSION (GASEOUS): ICD-10-CM

## 2022-10-13 DIAGNOSIS — Z98.890 OTHER SPECIFIED POSTPROCEDURAL STATES: Chronic | ICD-10-CM

## 2022-10-13 PROCEDURE — 74177 CT ABD & PELVIS W/CONTRAST: CPT

## 2022-10-13 PROCEDURE — 74177 CT ABD & PELVIS W/CONTRAST: CPT | Mod: 26,MH

## 2022-10-20 ENCOUNTER — TRANSCRIPTION ENCOUNTER (OUTPATIENT)
Age: 82
End: 2022-10-20

## 2022-11-21 RX ORDER — COLCHICINE 0.6 MG
1 TABLET ORAL
Qty: 0 | Refills: 0 | DISCHARGE
Start: 2022-11-21 | End: 2022-11-28

## 2022-11-21 RX ORDER — METRONIDAZOLE 500 MG
1 TABLET ORAL
Qty: 0 | Refills: 0 | DISCHARGE
Start: 2022-11-21 | End: 2022-12-01

## 2022-11-22 ENCOUNTER — INPATIENT (INPATIENT)
Facility: HOSPITAL | Age: 82
LOS: 0 days | Discharge: HOME HEALTH | DRG: 123 | End: 2022-11-23
Attending: FAMILY MEDICINE | Admitting: INTERNAL MEDICINE
Payer: MEDICARE

## 2022-11-22 VITALS — WEIGHT: 172.84 LBS

## 2022-11-22 DIAGNOSIS — H53.9 UNSPECIFIED VISUAL DISTURBANCE: ICD-10-CM

## 2022-11-22 DIAGNOSIS — Z98.890 OTHER SPECIFIED POSTPROCEDURAL STATES: Chronic | ICD-10-CM

## 2022-11-22 LAB
ALBUMIN SERPL ELPH-MCNC: 2.8 G/DL — LOW (ref 3.3–5)
ALP SERPL-CCNC: 131 U/L — HIGH (ref 40–120)
ALT FLD-CCNC: 58 U/L — SIGNIFICANT CHANGE UP (ref 12–78)
ANION GAP SERPL CALC-SCNC: 2 MMOL/L — LOW (ref 5–17)
APTT BLD: 33 SEC — SIGNIFICANT CHANGE UP (ref 27.5–35.5)
AST SERPL-CCNC: 42 U/L — HIGH (ref 15–37)
BASOPHILS # BLD AUTO: 0.01 K/UL — SIGNIFICANT CHANGE UP (ref 0–0.2)
BASOPHILS NFR BLD AUTO: 0.1 % — SIGNIFICANT CHANGE UP (ref 0–2)
BILIRUB SERPL-MCNC: 0.7 MG/DL — SIGNIFICANT CHANGE UP (ref 0.2–1.2)
BUN SERPL-MCNC: 29 MG/DL — HIGH (ref 7–23)
CALCIUM SERPL-MCNC: 9.2 MG/DL — SIGNIFICANT CHANGE UP (ref 8.5–10.1)
CHLORIDE SERPL-SCNC: 105 MMOL/L — SIGNIFICANT CHANGE UP (ref 96–108)
CO2 SERPL-SCNC: 33 MMOL/L — HIGH (ref 22–31)
CREAT SERPL-MCNC: 1.06 MG/DL — SIGNIFICANT CHANGE UP (ref 0.5–1.3)
EGFR: 69 ML/MIN/1.73M2 — SIGNIFICANT CHANGE UP
EOSINOPHIL # BLD AUTO: 0 K/UL — SIGNIFICANT CHANGE UP (ref 0–0.5)
EOSINOPHIL NFR BLD AUTO: 0 % — SIGNIFICANT CHANGE UP (ref 0–6)
GLUCOSE SERPL-MCNC: 175 MG/DL — HIGH (ref 70–99)
HCT VFR BLD CALC: 37.4 % — LOW (ref 39–50)
HGB BLD-MCNC: 12.4 G/DL — LOW (ref 13–17)
IMM GRANULOCYTES NFR BLD AUTO: 0.7 % — SIGNIFICANT CHANGE UP (ref 0–0.9)
INR BLD: 1.79 RATIO — HIGH (ref 0.88–1.16)
LYMPHOCYTES # BLD AUTO: 0.75 K/UL — LOW (ref 1–3.3)
LYMPHOCYTES # BLD AUTO: 9.9 % — LOW (ref 13–44)
MCHC RBC-ENTMCNC: 33.2 GM/DL — SIGNIFICANT CHANGE UP (ref 32–36)
MCHC RBC-ENTMCNC: 33.5 PG — SIGNIFICANT CHANGE UP (ref 27–34)
MCV RBC AUTO: 101.1 FL — HIGH (ref 80–100)
MONOCYTES # BLD AUTO: 0.25 K/UL — SIGNIFICANT CHANGE UP (ref 0–0.9)
MONOCYTES NFR BLD AUTO: 3.3 % — SIGNIFICANT CHANGE UP (ref 2–14)
NEUTROPHILS # BLD AUTO: 6.54 K/UL — SIGNIFICANT CHANGE UP (ref 1.8–7.4)
NEUTROPHILS NFR BLD AUTO: 86 % — HIGH (ref 43–77)
PLATELET # BLD AUTO: 427 K/UL — HIGH (ref 150–400)
POTASSIUM SERPL-MCNC: 4.6 MMOL/L — SIGNIFICANT CHANGE UP (ref 3.5–5.3)
POTASSIUM SERPL-SCNC: 4.6 MMOL/L — SIGNIFICANT CHANGE UP (ref 3.5–5.3)
PROT SERPL-MCNC: 7.1 GM/DL — SIGNIFICANT CHANGE UP (ref 6–8.3)
PROTHROM AB SERPL-ACNC: 20.9 SEC — HIGH (ref 10.5–13.4)
RBC # BLD: 3.7 M/UL — LOW (ref 4.2–5.8)
RBC # FLD: 12.2 % — SIGNIFICANT CHANGE UP (ref 10.3–14.5)
SODIUM SERPL-SCNC: 140 MMOL/L — SIGNIFICANT CHANGE UP (ref 135–145)
TROPONIN I, HIGH SENSITIVITY RESULT: 40.63 NG/L — SIGNIFICANT CHANGE UP
WBC # BLD: 7.6 K/UL — SIGNIFICANT CHANGE UP (ref 3.8–10.5)
WBC # FLD AUTO: 7.6 K/UL — SIGNIFICANT CHANGE UP (ref 3.8–10.5)

## 2022-11-22 PROCEDURE — 70551 MRI BRAIN STEM W/O DYE: CPT

## 2022-11-22 PROCEDURE — 82962 GLUCOSE BLOOD TEST: CPT

## 2022-11-22 PROCEDURE — 82746 ASSAY OF FOLIC ACID SERUM: CPT

## 2022-11-22 PROCEDURE — 83036 HEMOGLOBIN GLYCOSYLATED A1C: CPT

## 2022-11-22 PROCEDURE — 85610 PROTHROMBIN TIME: CPT

## 2022-11-22 PROCEDURE — 80048 BASIC METABOLIC PNL TOTAL CA: CPT

## 2022-11-22 PROCEDURE — 80061 LIPID PANEL: CPT

## 2022-11-22 PROCEDURE — 83615 LACTATE (LD) (LDH) ENZYME: CPT

## 2022-11-22 PROCEDURE — 82607 VITAMIN B-12: CPT

## 2022-11-22 PROCEDURE — 71045 X-RAY EXAM CHEST 1 VIEW: CPT | Mod: 26

## 2022-11-22 PROCEDURE — 97116 GAIT TRAINING THERAPY: CPT | Mod: GP

## 2022-11-22 PROCEDURE — 0042T: CPT | Mod: MA

## 2022-11-22 PROCEDURE — 93306 TTE W/DOPPLER COMPLETE: CPT

## 2022-11-22 PROCEDURE — 99285 EMERGENCY DEPT VISIT HI MDM: CPT

## 2022-11-22 PROCEDURE — 93970 EXTREMITY STUDY: CPT

## 2022-11-22 PROCEDURE — 85025 COMPLETE CBC W/AUTO DIFF WBC: CPT

## 2022-11-22 PROCEDURE — 85730 THROMBOPLASTIN TIME PARTIAL: CPT

## 2022-11-22 PROCEDURE — 70498 CT ANGIOGRAPHY NECK: CPT | Mod: 26,MA

## 2022-11-22 PROCEDURE — 97161 PT EVAL LOW COMPLEX 20 MIN: CPT | Mod: GP

## 2022-11-22 PROCEDURE — 70496 CT ANGIOGRAPHY HEAD: CPT | Mod: 26,MA

## 2022-11-22 PROCEDURE — 36415 COLL VENOUS BLD VENIPUNCTURE: CPT

## 2022-11-22 PROCEDURE — 87635 SARS-COV-2 COVID-19 AMP PRB: CPT

## 2022-11-22 PROCEDURE — 93005 ELECTROCARDIOGRAM TRACING: CPT

## 2022-11-22 NOTE — ED PROVIDER NOTE - WET READ LAUNCH FT
Patient called in to schedule an appointment. Patient was a former Dr. Thomas patient and has seen Nicole Mendoza. Patient is scheduled for an in clinic video visit on 6.4.2020 and lab appointment on 5.26.2020. Please review and advise. No labs entered for patient to complete.    There are no Wet Read(s) to document. There is 1 Wet Read(s) to document.

## 2022-11-22 NOTE — ED ADULT TRIAGE NOTE - CHIEF COMPLAINT QUOTE
Patient is alert and oriented X3, presenting to the ER with c/o sudden vision loss. Patient reports "I was in [facility] for 11 days with pneumonia. About 4 days ago I had 3 stents placed. I restarted my Coumadin yesterday. Today around 9am I noticed that my vision was not the same, it was blurred." MD Do evaluated patient in triage, code stroke activated at 1854. . Patient has left sided facial drop and blurred vision. Patient taken straight to CT-scan Patient is alert and oriented X3, presenting to the ER with c/o sudden vision loss. Patient reports "I was in [facility] for 11 days with pneumonia. About 4 days ago I had 3 stents placed. I restarted my Coumadin yesterday. Today around 9am I noticed that my vision was not the same, it was blurred." MD Do evaluated patient in triage, code stroke activated at 1854. . Patient has left sided facial drop, blurred vision and trouble finding his words. Patient taken straight to CT-scan  HX: Atrial Fibrillation.

## 2022-11-22 NOTE — ED PROVIDER NOTE - NSICDXPASTMEDICALHX_GEN_ALL_CORE_FT
PAST MEDICAL HISTORY:  Atrial Fibrillation     CAD (coronary artery disease)     Empyema of lung     Hyperlipidemia     Lymphoma     Old MI (myocardial infarction)     Recurrent pneumonia     TIA (transient ischemic attack)       
none

## 2022-11-22 NOTE — ED ADULT NURSE NOTE - CHIEF COMPLAINT QUOTE
Patient is alert and oriented X3, presenting to the ER with c/o sudden vision loss. Patient reports "I was in [facility] for 11 days with pneumonia. About 4 days ago I had 3 stents placed. I restarted my Coumadin yesterday. Today around 9am I noticed that my vision was not the same, it was blurred." MD Do evaluated patient in triage, code stroke activated at 1854. . Patient has left sided facial drop, blurred vision and trouble finding his words. Patient taken straight to CT-scan  HX: Atrial Fibrillation.

## 2022-11-22 NOTE — ED ADULT NURSE NOTE - OBJECTIVE STATEMENT
Patient presented to the ED c/o vision changes. Code activated at 1854. Patient reports that he was home today around 9 am when he noticed he was unable to read as normal. Patient states vision was blurry and slightly worse in the left eye. Patient denies recent fall. chest pain, sob. Patient reports recent d/c from [facility] after having three stents placed. Patient reports hx of afib. Patient reports taking coumadin. Patient is alert and oriented.

## 2022-11-22 NOTE — ED ADULT NURSE NOTE - NSICDXPASTMEDICALHX_GEN_ALL_CORE_FT
PAST MEDICAL HISTORY:  Atrial Fibrillation     CAD (coronary artery disease)     Empyema of lung     Hyperlipidemia     Lymphoma     Old MI (myocardial infarction)     Recurrent pneumonia     TIA (transient ischemic attack)

## 2022-11-22 NOTE — ED PROVIDER NOTE - OBJECTIVE STATEMENT
81 y/o male with PMHX of HLD, CAD, MI, and TIA presents to the ED c/o sudden vision loss. Patient reports that he was in [facility] for 11 days with pneumonia. About 4 days ago he had 3 stents placed. He restarted prescription Coumadin yesterday. Today around 9am he had sudden blurry vision. He was driven to the hospital by his wife. Pt was evaluated by Dr. Do in triage and a code stroke was called. He endorses left-sided facial drop, blurred vision and trouble finding his words. Denies any extremity weakness.

## 2022-11-22 NOTE — ED PROVIDER NOTE - PHYSICAL EXAMINATION
Constitutional: NAD AAOx3  Eyes: PERRL, EOMI  Head: Normocephalic atraumatic  Mouth: MMM  Cardiac: regular rate   Resp: Lungs CTAB  GI: Abd s/nt/nd  Neuro:   Extremities: Intact distal pulses b/l, no calf tenderness, normal ROM b/l UE and LE   Skin: No rashes Constitutional: NAD AAOx3  Eyes: PERRL, EOMI  Head: Normocephalic atraumatic  Mouth: MMM  Cardiac: regular rate   Resp: Lungs CTAB  GI: Abd s/nt/nd  Neuro: Left visual field diminished.   Extremities: Intact distal pulses b/l, no calf tenderness, normal ROM b/l UE and LE   Skin: No rashes Constitutional: NAD AAOx3  Eyes: PERRL, EOMI  Head: Normocephalic atraumatic  Mouth: MMM  Cardiac: regular rate   Resp: Lungs CTAB  GI: Abd s/nt/nd  Neuro: NIH 2 given for mild left sided facial droop and left upper visual field diminished   Extremities: Intact distal pulses b/l, no calf tenderness, normal ROM b/l UE and LE   Skin: No rashes

## 2022-11-22 NOTE — ED ADULT NURSE NOTE - NSIMPLEMENTINTERV_GEN_ALL_ED
Implemented All Fall with Harm Risk Interventions:  [place] to call system. Call bell, personal items and telephone within reach. Instruct patient to call for assistance. Room bathroom lighting operational. Non-slip footwear when patient is off stretcher. Physically safe environment: no spills, clutter or unnecessary equipment. Stretcher in lowest position, wheels locked, appropriate side rails in place. Provide visual cue, wrist band, yellow gown, etc. Monitor gait and stability. Monitor for mental status changes and reorient to person, place, and time. Review medications for side effects contributing to fall risk. Reinforce activity limits and safety measures with patient and family. Provide visual clues: red socks.

## 2022-11-22 NOTE — ED PROVIDER NOTE - CLINICAL SUMMARY MEDICAL DECISION MAKING FREE TEXT BOX
Patient's noncontrast head CT was reviewed, showed a possible lacunar infarct, CTA revealed possible tiny embolism at A1/A2 segment of the TOM, I have discussed with telestroke, Dr. Minor, the patient is out of the tPA window at this point in time in addition his INR is 1.79 and he is not a candidate for thrombectomy.  I did also discuss with neurology here.  I discussed with medicine will be admitted for further evaluation and care.

## 2022-11-22 NOTE — ED PROVIDER NOTE - NSICDXPASTSURGICALHX_GEN_ALL_CORE_FT
PAST SURGICAL HISTORY:  Empyema of Lung     History of prostate biopsy     Stented Coronary Artery

## 2022-11-22 NOTE — ED PROVIDER NOTE - NS ED ROS FT
Constitutional: No fever or chills  Eyes: No visual changes  HEENT: No throat pain  CV: No chest pain  Resp: No SOB no cough  GI: No abd pain, nausea or vomiting  : No dysuria  MSK: No musculoskeletal pain  Skin: No rash  Neuro: + Blurry vision

## 2022-11-23 ENCOUNTER — TRANSCRIPTION ENCOUNTER (OUTPATIENT)
Age: 82
End: 2022-11-23

## 2022-11-23 VITALS
DIASTOLIC BLOOD PRESSURE: 89 MMHG | SYSTOLIC BLOOD PRESSURE: 132 MMHG | OXYGEN SATURATION: 95 % | TEMPERATURE: 97 F | HEART RATE: 73 BPM | RESPIRATION RATE: 18 BRPM

## 2022-11-23 LAB
A1C WITH ESTIMATED AVERAGE GLUCOSE RESULT: 6.4 % — HIGH (ref 4–5.6)
ANION GAP SERPL CALC-SCNC: 4 MMOL/L — LOW (ref 5–17)
APTT BLD: 31.2 SEC — SIGNIFICANT CHANGE UP (ref 27.5–35.5)
BASOPHILS # BLD AUTO: 0.01 K/UL — SIGNIFICANT CHANGE UP (ref 0–0.2)
BASOPHILS NFR BLD AUTO: 0.1 % — SIGNIFICANT CHANGE UP (ref 0–2)
BUN SERPL-MCNC: 25 MG/DL — HIGH (ref 7–23)
CALCIUM SERPL-MCNC: 9 MG/DL — SIGNIFICANT CHANGE UP (ref 8.5–10.1)
CHLORIDE SERPL-SCNC: 106 MMOL/L — SIGNIFICANT CHANGE UP (ref 96–108)
CHOLEST SERPL-MCNC: 140 MG/DL — SIGNIFICANT CHANGE UP
CO2 SERPL-SCNC: 29 MMOL/L — SIGNIFICANT CHANGE UP (ref 22–31)
CREAT SERPL-MCNC: 0.96 MG/DL — SIGNIFICANT CHANGE UP (ref 0.5–1.3)
EGFR: 78 ML/MIN/1.73M2 — SIGNIFICANT CHANGE UP
EOSINOPHIL # BLD AUTO: 0.04 K/UL — SIGNIFICANT CHANGE UP (ref 0–0.5)
EOSINOPHIL NFR BLD AUTO: 0.6 % — SIGNIFICANT CHANGE UP (ref 0–6)
ESTIMATED AVERAGE GLUCOSE: 137 MG/DL — HIGH (ref 68–114)
FOLATE SERPL-MCNC: 9.2 NG/ML — SIGNIFICANT CHANGE UP
GLUCOSE SERPL-MCNC: 142 MG/DL — HIGH (ref 70–99)
HCT VFR BLD CALC: 38.4 % — LOW (ref 39–50)
HDLC SERPL-MCNC: 54 MG/DL — SIGNIFICANT CHANGE UP
HGB BLD-MCNC: 12.7 G/DL — LOW (ref 13–17)
IMM GRANULOCYTES NFR BLD AUTO: 0.9 % — SIGNIFICANT CHANGE UP (ref 0–0.9)
INR BLD: 2.04 RATIO — HIGH (ref 0.88–1.16)
LDH SERPL L TO P-CCNC: 216 U/L — SIGNIFICANT CHANGE UP (ref 84–241)
LIPID PNL WITH DIRECT LDL SERPL: 71 MG/DL — SIGNIFICANT CHANGE UP
LYMPHOCYTES # BLD AUTO: 1.52 K/UL — SIGNIFICANT CHANGE UP (ref 1–3.3)
LYMPHOCYTES # BLD AUTO: 21.7 % — SIGNIFICANT CHANGE UP (ref 13–44)
MCHC RBC-ENTMCNC: 33.1 GM/DL — SIGNIFICANT CHANGE UP (ref 32–36)
MCHC RBC-ENTMCNC: 33.3 PG — SIGNIFICANT CHANGE UP (ref 27–34)
MCV RBC AUTO: 100.8 FL — HIGH (ref 80–100)
MONOCYTES # BLD AUTO: 0.35 K/UL — SIGNIFICANT CHANGE UP (ref 0–0.9)
MONOCYTES NFR BLD AUTO: 5 % — SIGNIFICANT CHANGE UP (ref 2–14)
NEUTROPHILS # BLD AUTO: 5.04 K/UL — SIGNIFICANT CHANGE UP (ref 1.8–7.4)
NEUTROPHILS NFR BLD AUTO: 71.7 % — SIGNIFICANT CHANGE UP (ref 43–77)
NON HDL CHOLESTEROL: 86 MG/DL — SIGNIFICANT CHANGE UP
PLATELET # BLD AUTO: 436 K/UL — HIGH (ref 150–400)
POTASSIUM SERPL-MCNC: 3.9 MMOL/L — SIGNIFICANT CHANGE UP (ref 3.5–5.3)
POTASSIUM SERPL-SCNC: 3.9 MMOL/L — SIGNIFICANT CHANGE UP (ref 3.5–5.3)
PROTHROM AB SERPL-ACNC: 23.8 SEC — HIGH (ref 10.5–13.4)
RBC # BLD: 3.81 M/UL — LOW (ref 4.2–5.8)
RBC # FLD: 12.4 % — SIGNIFICANT CHANGE UP (ref 10.3–14.5)
SARS-COV-2 RNA SPEC QL NAA+PROBE: SIGNIFICANT CHANGE UP
SODIUM SERPL-SCNC: 139 MMOL/L — SIGNIFICANT CHANGE UP (ref 135–145)
TRIGL SERPL-MCNC: 74 MG/DL — SIGNIFICANT CHANGE UP
VIT B12 SERPL-MCNC: 793 PG/ML — SIGNIFICANT CHANGE UP (ref 232–1245)
WBC # BLD: 7.02 K/UL — SIGNIFICANT CHANGE UP (ref 3.8–10.5)
WBC # FLD AUTO: 7.02 K/UL — SIGNIFICANT CHANGE UP (ref 3.8–10.5)

## 2022-11-23 PROCEDURE — 99223 1ST HOSP IP/OBS HIGH 75: CPT

## 2022-11-23 PROCEDURE — 99236 HOSP IP/OBS SAME DATE HI 85: CPT

## 2022-11-23 PROCEDURE — 12345: CPT | Mod: NC

## 2022-11-23 PROCEDURE — 93010 ELECTROCARDIOGRAM REPORT: CPT

## 2022-11-23 PROCEDURE — 70551 MRI BRAIN STEM W/O DYE: CPT | Mod: 26

## 2022-11-23 PROCEDURE — 93306 TTE W/DOPPLER COMPLETE: CPT | Mod: 26

## 2022-11-23 PROCEDURE — 93970 EXTREMITY STUDY: CPT | Mod: 26

## 2022-11-23 RX ORDER — WARFARIN SODIUM 2.5 MG/1
1 TABLET ORAL
Qty: 0 | Refills: 0 | DISCHARGE

## 2022-11-23 RX ORDER — LANOLIN ALCOHOL/MO/W.PET/CERES
3 CREAM (GRAM) TOPICAL AT BEDTIME
Refills: 0 | Status: DISCONTINUED | OUTPATIENT
Start: 2022-11-23 | End: 2022-11-23

## 2022-11-23 RX ORDER — FUROSEMIDE 40 MG
20 TABLET ORAL DAILY
Refills: 0 | Status: DISCONTINUED | OUTPATIENT
Start: 2022-11-23 | End: 2022-11-23

## 2022-11-23 RX ORDER — POLYETHYLENE GLYCOL 3350 17 G/17G
17 POWDER, FOR SOLUTION ORAL DAILY
Refills: 0 | Status: DISCONTINUED | OUTPATIENT
Start: 2022-11-23 | End: 2022-11-23

## 2022-11-23 RX ORDER — METRONIDAZOLE 500 MG
500 TABLET ORAL THREE TIMES A DAY
Refills: 0 | Status: DISCONTINUED | OUTPATIENT
Start: 2022-11-23 | End: 2022-11-23

## 2022-11-23 RX ORDER — HYDRALAZINE HCL 50 MG
10 TABLET ORAL EVERY 6 HOURS
Refills: 0 | Status: DISCONTINUED | OUTPATIENT
Start: 2022-11-23 | End: 2022-11-23

## 2022-11-23 RX ORDER — CEFUROXIME AXETIL 250 MG
500 TABLET ORAL
Refills: 0 | Status: DISCONTINUED | OUTPATIENT
Start: 2022-11-23 | End: 2022-11-23

## 2022-11-23 RX ORDER — PRASUGREL 5 MG/1
5 TABLET, FILM COATED ORAL DAILY
Refills: 0 | Status: DISCONTINUED | OUTPATIENT
Start: 2022-11-23 | End: 2022-11-23

## 2022-11-23 RX ORDER — FINASTERIDE 5 MG/1
5 TABLET, FILM COATED ORAL DAILY
Refills: 0 | Status: DISCONTINUED | OUTPATIENT
Start: 2022-11-23 | End: 2022-11-23

## 2022-11-23 RX ORDER — ATORVASTATIN CALCIUM 80 MG/1
20 TABLET, FILM COATED ORAL
Qty: 0 | Refills: 0 | DISCHARGE

## 2022-11-23 RX ORDER — ONDANSETRON 8 MG/1
4 TABLET, FILM COATED ORAL EVERY 8 HOURS
Refills: 0 | Status: DISCONTINUED | OUTPATIENT
Start: 2022-11-23 | End: 2022-11-23

## 2022-11-23 RX ORDER — ACETAMINOPHEN 500 MG
650 TABLET ORAL EVERY 6 HOURS
Refills: 0 | Status: DISCONTINUED | OUTPATIENT
Start: 2022-11-23 | End: 2022-11-23

## 2022-11-23 RX ORDER — ATORVASTATIN CALCIUM 80 MG/1
80 TABLET, FILM COATED ORAL AT BEDTIME
Refills: 0 | Status: DISCONTINUED | OUTPATIENT
Start: 2022-11-23 | End: 2022-11-23

## 2022-11-23 RX ORDER — COLCHICINE 0.6 MG
0.6 TABLET ORAL DAILY
Refills: 0 | Status: DISCONTINUED | OUTPATIENT
Start: 2022-11-23 | End: 2022-11-23

## 2022-11-23 RX ORDER — ATORVASTATIN CALCIUM 80 MG/1
1 TABLET, FILM COATED ORAL
Qty: 30 | Refills: 0
Start: 2022-11-23 | End: 2022-12-22

## 2022-11-23 RX ORDER — WARFARIN SODIUM 2.5 MG/1
3 TABLET ORAL ONCE
Refills: 0 | Status: COMPLETED | OUTPATIENT
Start: 2022-11-23 | End: 2022-11-23

## 2022-11-23 RX ORDER — TAMSULOSIN HYDROCHLORIDE 0.4 MG/1
0.4 CAPSULE ORAL AT BEDTIME
Refills: 0 | Status: DISCONTINUED | OUTPATIENT
Start: 2022-11-23 | End: 2022-11-23

## 2022-11-23 RX ADMIN — FINASTERIDE 5 MILLIGRAM(S): 5 TABLET, FILM COATED ORAL at 11:42

## 2022-11-23 RX ADMIN — Medication 0.6 MILLIGRAM(S): at 11:42

## 2022-11-23 RX ADMIN — PRASUGREL 5 MILLIGRAM(S): 5 TABLET, FILM COATED ORAL at 12:12

## 2022-11-23 RX ADMIN — Medication 500 MILLIGRAM(S): at 06:14

## 2022-11-23 RX ADMIN — Medication 10 MILLIGRAM(S): at 05:14

## 2022-11-23 RX ADMIN — Medication 20 MILLIGRAM(S): at 11:41

## 2022-11-23 RX ADMIN — WARFARIN SODIUM 3 MILLIGRAM(S): 2.5 TABLET ORAL at 12:11

## 2022-11-23 RX ADMIN — Medication 500 MILLIGRAM(S): at 11:42

## 2022-11-23 RX ADMIN — Medication 20 MILLIGRAM(S): at 11:42

## 2022-11-23 NOTE — PHYSICAL THERAPY INITIAL EVALUATION ADULT - NSPTDISCHREC_GEN_A_CORE
Pt was able to amb 200' independently w/o any AD, Pt also did 10 steps independently w/supervision, fast and steady on feet, Pt did toilet activities during PT session, Pt was left sitting in chair at end of PT rx, alarm on, Pt does not require acute care PT, DC from PT, MRI pending/No skilled PT needs

## 2022-11-23 NOTE — DISCHARGE NOTE PROVIDER - CARE PROVIDER_API CALL
Goldberg, Steven M (MD)  Cardiovascular Disease; Internal Medicine  [address] [place] [address]  Phone: [phone]  Fax: [phone]  Follow Up Time: 1 week

## 2022-11-23 NOTE — DISCHARGE NOTE PROVIDER - NSDCHHNEEDSERVICE_GEN_ALL_CORE
Discharge [id number] [facility]  Clinical Case Management Department  Written by: Anu Hanna RN    Patient Name: Marilynn Jacksoncarmen  Attending Provider: No att. providers found  Admit Date: 2020  9:24 PM  MRN: [id number]  Account: [de-identified]                     : 1964  Discharge Date: 2020      Disposition: home    Anu Hanna RN Medication teaching and assessment/Observation and assessment

## 2022-11-23 NOTE — PHYSICAL THERAPY INITIAL EVALUATION ADULT - DIAGNOSIS, PT EVAL
Left eye blurry vision, CT Head: Possible lacunar infarct right caudate head nucleus age-indeterminate. Left pleural effusion

## 2022-11-23 NOTE — H&P ADULT - NSICDXPASTSURGICALHX_GEN_ALL_CORE_FT
PAST SURGICAL HISTORY:  Empyema of Lung     History of prostate biopsy     Stented Coronary Artery

## 2022-11-23 NOTE — H&P ADULT - HISTORY OF PRESENT ILLNESS
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 Pt denies weakness of the extremities, decreased sensation, slurred speech. States that he had a mild left facial droop and difficulty getting words out at times for years. The pt was discharged from [facility] 2 days ago. he was initially admitted for PNA and then had a cardiac catheterization with 2 stents placed in the LAD and 1 stent placed in the LCx. He had a subsequent pericardial effusion and underwent pericardial drain with 500 cc removed (fluid was post viral and post cath), also had a left thoracentesis with removal of 1300 ml of fluid. He was off his coumadin for some time, but recently restarted. Today his INR was 1.79, he took 5 mg of Warfarin yesterday and 2 mg today. Denies fevers, chills, chest pain, SOB, abdominal pain, N/V,  diarrhea/constipation. I spoke to the pt's daughter, Dr. Jeanette Diaz over the phone who provided some of the history.     ER course: VSS. Labs: Hb 12.4, .1, , neutrophils 86%, INR 1.79, glucose 175, albumin 2.8, alkaline phosphatase 131, AST 42. COVID negative.     EKG: pending, f/u result.     Imaging:   - CXR: moderate to large left pleural effusion, no pneumothorax, no consolidation, cardiomegaly (personally reviewed).   - CT PERFUSION demonstrated: No core infarct. No active ischemia.  If symptoms persist consider follow up head CT or MRI, MRA if no contraindication.  - CTA COW: Cannot exclude tiny embolus in the junction of the left A1-A2 segment of the TOM, images 601- 48 to 51.  - CTA NECK: Patent, ECAs, ICAs, no hemodynamically significant stenosis at ICA origins by NASCET criteria. Bilateral vertebral arteries are patent without flow limiting stenosis.  - CT head: HEAD CT: Mild volume loss, microvascular disease, no acute hemorrhage or midline shift. Possible lacunar infarct right caudate head nucleus age-indeterminate.  Old left basal ganglia lacunar infarct.    Pt is being admitted to telemetry for further management.  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 Pt denies weakness of the extremities, decreased sensation, slurred speech. States that he had a mild left facial droop and difficulty getting words out at times for years. The pt was discharged from [facility] 2 days ago. he was initially admitted for PNA and then had a cardiac catheterization with 2 stents placed in the LAD and 1 stent placed in the LCx. He had a subsequent pericardial effusion and underwent pericardial drain with 500 cc removed (fluid was post viral and post cath), also had a left thoracentesis with removal of 1300 ml of fluid. He was off his coumadin for some time, but recently restarted. Today his INR was 1.79, he took 5 mg of Warfarin yesterday and 2 mg today. Denies fevers, chills, chest pain, SOB, abdominal pain, N/V,  diarrhea/constipation. I spoke to the pt's daughter, Dr. Jeanette Diaz over the phone who provided some of the history.     ER course: VSS. Labs: Hb 12.4, .1, , neutrophils 86%, INR 1.79, glucose 175, albumin 2.8, alkaline phosphatase 131, AST 42. COVID negative. EKG: A fib with HR 74 bpm, normal intervals, incomplete LBBB, T wave inversion in AVL, no ST segment changes, does not meet Sgarbossa criteria (personally reviewed).     Imaging:   - CXR: moderate to large left pleural effusion, no pneumothorax, no consolidation, cardiomegaly (personally reviewed).   - CT PERFUSION demonstrated: No core infarct. No active ischemia.  If symptoms persist consider follow up head CT or MRI, MRA if no contraindication.  - CTA COW: Cannot exclude tiny embolus in the junction of the left A1-A2 segment of the TOM, images 601- 48 to 51.  - CTA NECK: Patent, ECAs, ICAs, no hemodynamically significant stenosis at ICA origins by NASCET criteria. Bilateral vertebral arteries are patent without flow limiting stenosis.  - CT head: HEAD CT: Mild volume loss, microvascular disease, no acute hemorrhage or midline shift. Possible lacunar infarct right caudate head nucleus age-indeterminate.  Old left basal ganglia lacunar infarct.    Pt is being admitted to telemetry for further management.

## 2022-11-23 NOTE — DISCHARGE NOTE PROVIDER - NSDCCPCAREPLAN_GEN_ALL_CORE_FT
PRINCIPAL DISCHARGE DIAGNOSIS  Diagnosis: Vision changes  Assessment and Plan of Treatment: likely due to occlusion one of the arteries, due to being off coumadin  INR theraputic  C/w Coumadin   Check INR in 2 dasys  F/u with CArdio  Also need to f/u with neuro to repeat MRI      SECONDARY DISCHARGE DIAGNOSES  Diagnosis: CAD (coronary artery disease)  Assessment and Plan of Treatment: C/w Prasugrel and Coumadin  C/w Lipitor   F/u with CArdio

## 2022-11-23 NOTE — INPATIENT CERTIFICATION FOR MEDICARE PATIENTS - PHYSICIAN CONCUR
I concur with the Admission Order and I certify that services are provided in accordance with Section 42 CFR § 412.3 Pediatrician/CLAUDIA

## 2022-11-23 NOTE — H&P ADULT - NSHPREVIEWOFSYSTEMS_GEN_ALL_CORE
Constitutional: negative for fatigue, negative for fever, negative for chills, negative for decreased appetite.  Skin: negative for rashes, negative for open wounds, negative for jaundice.   Eyes: left eye positive for blurry vision, negative for double vision.   Ears, nose, throat: negative for ear pain, negative for nasal congestion, negative for sore throat, negative for lymph node swelling.   Cardiovascular: negative for chest pain, negative for palpitations, negative for lower extremity swelling.   Respiratory: negative for shortness of breath, negative for wheezing, negative for cough.   Gastrointestinal: negative for abdominal pain, negative for nausea, negative for vomiting, negative for diarrhea, negative for constipation, negative for blood in the stool, negative for black tarry stools.   Genitourinary: negative for burning on urination, negative for urinary urgency or frequency, negative for blood in the urine.   Endocrine: negative for cold intolerance, negative for heat intolerance, negative for increased thirst.   Hematologic: negative for easy bruising or bleeding.   Musculoskeletal: negative for muscle/joint pain, negative for decreased range of motion.   Neurological: negative for dizziness, negative for headaches, negative for loss of consciousness, negative for motor weakness, negative for sensory deficits.   Psychiatric: negative for depression, negative for anxiety.

## 2022-11-23 NOTE — H&P ADULT - NSHPPHYSICALEXAM_GEN_ALL_CORE
ICU Vital Signs Last 24 Hrs  HR: 80 (22 Nov 2022 21:30) (67 - 81)  BP: 129/85 (22 Nov 2022 21:30) (129/85 - 140/87)  BP(mean): 98 (22 Nov 2022 20:30) (98 - 104)  RR: 18 (22 Nov 2022 21:30) (18 - 23)  SpO2: 97% (22 Nov 2022 21:30) (96% - 97%)    O2 Parameters below as of 22 Nov 2022 21:30  Patient On (Oxygen Delivery Method): room air    General: Awake and alert, cooperative with exam. No acute distress.   Skin: Warm, dry, and pink.   Eyes: Pupils equal and reactive to light. Extraocular eye movements intact. No conjunctival injection, discharge, or scleral icterus.   HEENT: Atraumatic, normocephalic. Moist mucus membranes.   Cardiology: Normal S1, S2. No murmurs, rubs, or gallops. Irregularly irregular, no tachycardia.   Respiratory: Decreased lung sounds at the left lung base. Normal chest expansion. No accessory muscle use.   Gastrointestinal: Positive bowel sounds. Soft, non-tender, non-distended. No guarding, rigidity, or rebound tenderness. No hepatosplenomegaly.   Musculoskeletal: 5/5 motor strength in all extremities. Normal range of motion.   Extremities: No peripheral edema bilaterally. Dorsalis pedis pulses 2+ bilaterally.   Neurological: A+Ox3 (person, place, and time). Cranial nerves 2-12 intact. Normal speech. No facial droop. No focal neurological deficits. 5/5 motor strength in all extremities. Sensation intact.   Psychiatric: Normal affect. Normal mood.

## 2022-11-23 NOTE — DISCHARGE NOTE PROVIDER - NSDCMRMEDTOKEN_GEN_ALL_CORE_FT
Artificial Tears ophthalmic solution: 1 drop(s) to each affected eye 2 times a day  atorvastatin 40 mg oral tablet: 1 tab(s) orally once a day  benzonatate 100 mg oral capsule: 1 cap(s) orally 3 times a day, As Needed  cefadroxil 500 mg oral capsule: 1 cap(s) orally every 12 hours  colchicine 0.6 mg oral tablet: 1 tab(s) orally once a day  Lasix 20 mg oral tablet: 1 tab(s) orally once a day  metroNIDAZOLE 500 mg oral tablet: 1 tab(s) orally 3 times a day  MiraLax oral powder for reconstitution: 17 gram(s) orally once a day, As Needed  prasugrel 5 mg oral tablet: 1 tab(s) orally once a day  predniSONE 20 mg oral tablet: 1 tab(s) orally once a day  Proscar 5 mg oral tablet: 1 tab(s) orally once a day  tamsulosin 0.4 mg oral capsule: 1 cap(s) orally once a day (at bedtime)  warfarin 2 mg oral tablet: 1 tab(s) orally once a day

## 2022-11-23 NOTE — PATIENT PROFILE ADULT - FALL HARM RISK - UNIVERSAL INTERVENTIONS
Bed in lowest position, wheels locked, appropriate side rails in place/Call bell, personal items and telephone in reach/Instruct patient to call for assistance before getting out of bed or chair/Non-slip footwear when patient is out of bed/[place] to call system/Physically safe environment - no spills, clutter or unnecessary equipment/Purposeful Proactive Rounding/Room/bathroom lighting operational, light cord in reach

## 2022-11-23 NOTE — H&P ADULT - ASSESSMENT
83 y/o M presented with blurry vision of the left eye     1. Blurry vision left eye likely secondary to CVA (tiny embolus A1-A2 segment of the left TOM, and possible lacunar infarct caudate head nucleus age indeterminate old left basal ganglia lacunar infarct)   - Admit to telemetry  - NIHSS 2 in the ER     - Not a candidate for TPA or thrombectomy per telestroke (out of the window for TPA and INR 1.79), ER physician also spoke to neurology   - CT head: possible lacunar infarct caudate head nucleus age indeterminate old left basal ganglia lacunar infarct  - CT COW: tiny embolus A1-A2 segment of the left TOM  - Ordered MRI brain, lipid panel, HbA1c, ECHO   - Neuro checks Q4H  - NPO until dysphagia evaluation completed   - Blood glucose checks Q6H   - Dysphagia evaluation   - Pt on Prasugrel and Coumadin, will monitor off aspirin at this time d/t recent hemorrhagic pericardial effusion and increased bleeding risk   - Increased atorvastatin to 80 mg daily  - PT evaluation  - Neurology consult - Dr. Pollard     2. Left pleural effusion   - Pt had a large left pleural effusion s/p thoracentesis within 1 week at [place]   - Please try and obtain records from [place]   - Plan for diagnostic/therapeutic thoracentesis   - Ordered LDH serum   - Cardiothoracic surgery consult - Dr. Bernstein     3. Macrocytic anemia  - Hb 12.4, .1, monitor   - Ordered B12 and folate levels     4. Thrombocytosis likely reactive   - , trend     5. Hyperglycemia   - Glucose 175, monitor   - Ordered HbA1c     6. Elevated alkaline phosphatase and AST   - Alkaline phosphatase 131, AST 42, trend   - If persistent elevation, will order RUQ US     7. Hypoalbuminemia   - Albumin 2.8   - Nutrition consult     8. 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 The patient is A+Ox3 at the time of my evaluation and has full capacity. Able to make an informed decision and understands risks associated with decisions made).   Emergency contact: Dr. Jeanette Diaz, gastroenterologist in [place] (daughter/HCP)  83 y/o M presented with blurry vision of the left eye     1. Blurry vision left eye likely secondary to CVA (tiny embolus A1-A2 segment of the left TOM, and possible lacunar infarct caudate head nucleus age indeterminate old left basal ganglia lacunar infarct)   - Admit to telemetry  - NIHSS 2 in the ER     - Not a candidate for TPA or thrombectomy per telestroke (out of the window for TPA and INR 1.79), ER physician also spoke to neurology   - CT head: possible lacunar infarct caudate head nucleus age indeterminate old left basal ganglia lacunar infarct  - CT COW: tiny embolus A1-A2 segment of the left TOM  - Ordered MRI brain, lipid panel, HbA1c, ECHO   - Neuro checks Q4H  - Passed dysphagia evaluation in the ER -> cardiac diet   - Blood glucose checks Q6H   - Pt on Prasugrel and Coumadin, will monitor off aspirin at this time d/t recent hemorrhagic pericardial effusion and increased bleeding risk   - Increased atorvastatin to 80 mg daily  - PT evaluation  - Neurology consult - Dr. Pollard     2. Left pleural effusion   - Pt had a large left pleural effusion s/p thoracentesis within 1 week at [place]   - Please try and obtain records from [place]   - Plan for diagnostic/therapeutic thoracentesis   - Ordered LDH serum   - Cardiothoracic surgery consult - Dr. Bernstein     3. Macrocytic anemia  - Hb 12.4, .1, monitor   - Ordered B12 and folate levels     4. Thrombocytosis likely reactive   - , trend     5. Hyperglycemia   - Glucose 175, monitor   - Ordered HbA1c     6. Elevated alkaline phosphatase and AST   - Alkaline phosphatase 131, AST 42, trend   - If persistent elevation, will order RUQ US     7. Hypoalbuminemia   - Albumin 2.8   - Nutrition consult     8. 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 The patient is A+Ox3 at the time of my evaluation and has full capacity. Able to make an informed decision and understands risks associated with decisions made).   Emergency contact: Dr. Jeanette Diaz, gastroenterologist in [place] (daughter/HCP)

## 2022-11-23 NOTE — H&P ADULT - NSICDXPASTMEDICALHX_GEN_ALL_CORE_FT
PAST MEDICAL HISTORY:  Atrial Fibrillation     CAD (coronary artery disease)     CVA (cerebrovascular accident) s/p TPA 30 years ago    Empyema of lung     H/O pleural effusion left, s/p thoracentesis 11/2022    Hyperlipidemia     Lymphoma Follicular B cell lymphoma Stage 4 treated with Rituxan in 2005    Old MI (myocardial infarction)     Pericardial effusion s/p drain 11/2022    Recurrent pneumonia     TIA (transient ischemic attack)

## 2022-11-23 NOTE — H&P ADULT - NSCORESITESY/N_GEN_A_CORE_RD
Problem: Gastrointestinal - Adult  Goal: Maintains or returns to baseline bowel function  Outcome: Not Progressing  Goal: Maintains adequate nutritional intake  Outcome: Not Progressing     Problem: Skin/Tissue Integrity  Goal: Absence of new skin breakdown  Description: 1. Monitor for areas of redness and/or skin breakdown  2. Assess vascular access sites hourly  3. Every 4-6 hours minimum:  Change oxygen saturation probe site  4. Every 4-6 hours:  If on nasal continuous positive airway pressure, respiratory therapy assess nares and determine need for appliance change or resting period. Outcome: Progressing     Problem: Safety - Adult  Goal: Free from fall injury  Outcome: Progressing     Problem: ABCDS Injury Assessment  Goal: Absence of physical injury  Outcome: Progressing     Problem: Respiratory - Adult  Goal: Achieves optimal ventilation and oxygenation  Outcome: Progressing     Problem: Skin/Tissue Integrity - Adult  Goal: Skin integrity remains intact  Outcome: Progressing     Problem: Safety - Medical Restraint  Goal: Remains free of injury from restraints (Restraint for Interference with Medical Device)  Description: INTERVENTIONS:  1. Determine that other, less restrictive measures have been tried or would not be effective before applying the restraint  2. Evaluate the patient's condition at the time of restraint application  3. Inform patient/family regarding the reason for restraint  4.  Q2H: Monitor safety, psychosocial status, comfort, nutrition and hydration  11/13/2022 0045 by Jeri Tobias RN  Outcome: Progressing  Flowsheets  Taken 11/12/2022 1800 by Young Farias RN  Remains free of injury from restraints (restraint for interference with medical device): Every 2 hours: Monitor safety, psychosocial status, comfort, nutrition and hydration  Taken 11/12/2022 1600 by Young Farias RN  Remains free of injury from restraints (restraint for interference with medical device): Every 2 hours: Monitor safety, psychosocial status, comfort, nutrition and hydration  11/12/2022 1507 by Jose Valdez RN  Outcome: Not Progressing  Flowsheets  Taken 11/12/2022 1507  Remains free of injury from restraints (restraint for interference with medical device): Every 2 hours: Monitor safety, psychosocial status, comfort, nutrition and hydration  Taken 11/12/2022 1506  Remains free of injury from restraints (restraint for interference with medical device): Every 2 hours: Monitor safety, psychosocial status, comfort, nutrition and hydration  Taken 11/12/2022 1000  Remains free of injury from restraints (restraint for interference with medical device): Every 2 hours: Monitor safety, psychosocial status, comfort, nutrition and hydration     Problem: Safety - Medical Restraint  Goal: Remains free of injury from restraints (Restraint for Interference with Medical Device)  Description: INTERVENTIONS:  1. Determine that other, less restrictive measures have been tried or would not be effective before applying the restraint  2. Evaluate the patient's condition at the time of restraint application  3. Inform patient/family regarding the reason for restraint  4.  Q2H: Monitor safety, psychosocial status, comfort, nutrition and hydration  11/13/2022 0045 by Tiffanie Pardo RN  Outcome: Progressing  Flowsheets  Taken 11/12/2022 1800 by Jose Valdez RN  Remains free of injury from restraints (restraint for interference with medical device): Every 2 hours: Monitor safety, psychosocial status, comfort, nutrition and hydration  Taken 11/12/2022 1600 by Jose Valdez RN  Remains free of injury from restraints (restraint for interference with medical device): Every 2 hours: Monitor safety, psychosocial status, comfort, nutrition and hydration  11/12/2022 1507 by Jose Valdez RN  Outcome: Not Progressing  Flowsheets  Taken 11/12/2022 1507  Remains free of injury from restraints (restraint for interference with medical device): Every 2 hours: Monitor safety, psychosocial status, comfort, nutrition and hydration  Taken 11/12/2022 1506  Remains free of injury from restraints (restraint for interference with medical device): Every 2 hours: Monitor safety, psychosocial status, comfort, nutrition and hydration  Taken 11/12/2022 1000  Remains free of injury from restraints (restraint for interference with medical device): Every 2 hours: Monitor safety, psychosocial status, comfort, nutrition and hydration     Problem: Gastrointestinal - Adult  Goal: Maintains or returns to baseline bowel function  Outcome: Not Progressing  Goal: Maintains adequate nutritional intake  Outcome: Not Progressing Yes

## 2022-11-23 NOTE — CONSULT NOTE ADULT - SUBJECTIVE AND OBJECTIVE BOX
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 Pt denies weakness of the extremities, decreased sensation, slurred speech. States that he had a mild left facial droop and difficulty getting words out at times for years. The pt was discharged from [facility] days ago. he was initially admitted for PNA and then had a cardiac catheterization with 2 stents placed in the LAD and 1 stent placed in the LCx. He had a subsequent pericardial effusion and underwent pericardial drain with 500 cc removed (fluid was post viral and post cath), also had a left thoracentesis with removal of 1300 ml of fluid. He was off his coumadin for some time, but recently restarted. On 11/22 his INR was 1.79, he took 5 mg of Warfarin yesterday and 2 mg today. Denies fevers, chills, chest pain, SOB, abdominal pain, N/V,  diarrhea/constipation.    ER course: VSS. Labs: Hb 12.4, .1, , neutrophils 86%, INR 1.79, glucose 175, albumin 2.8, alkaline phosphatase 131, AST 42. COVID negative. EKG: A fib with HR 74 bpm, normal intervals, incomplete LBBB, T wave inversion in AVL, no ST segment changes, does not meet Sgarbossa criteria (personally reviewed).     Pt is being admitted to telemetry for further management.  (23 Nov 2022 02:58)      PAST MEDICAL & SURGICAL HISTORY:  Atrial Fibrillation      Hyperlipidemia      CAD (coronary artery disease)      Empyema of lung      Old MI (myocardial infarction)      Recurrent pneumonia      TIA (transient ischemic attack)      Lymphoma  Follicular B cell lymphoma Stage 4 treated with Rituxan in 2005      CVA (cerebrovascular accident)  s/p TPA 30 years ago      Pericardial effusion  s/p drain 11/2022      H/O pleural effusion  left, s/p thoracentesis 11/2022      Stented Coronary Artery      Empyema of Lung      History of prostate biopsy          FAMILY HISTORY:  FH: heart disease (Father, Mother)        Social Hx:  Nonsmoker, no drug or alcohol use    MEDICATIONS  (STANDING):  artificial  tears Solution 1 Drop(s) Both EYES daily  atorvastatin 80 milliGRAM(s) Oral at bedtime  cefuroxime   Tablet 500 milliGRAM(s) Oral two times a day  colchicine 0.6 milliGRAM(s) Oral daily  finasteride 5 milliGRAM(s) Oral daily  furosemide    Tablet 20 milliGRAM(s) Oral daily  metroNIDAZOLE    Tablet 500 milliGRAM(s) Oral three times a day  prasugrel 5 milliGRAM(s) Oral daily  predniSONE   Tablet 20 milliGRAM(s) Oral daily  tamsulosin 0.4 milliGRAM(s) Oral at bedtime  warfarin 3 milliGRAM(s) Oral once       Allergies    sulfa drugs (Unknown)  sulfonamides (Unknown)    Intolerances        ROS: Pertinent positives in HPI, all other ROS were reviewed and are negative.      Vital Signs Last 24 Hrs  T(C): 36.3 (23 Nov 2022 08:53), Max: 36.6 (22 Nov 2022 21:30)  T(F): 97.4 (23 Nov 2022 08:53), Max: 97.9 (22 Nov 2022 21:30)  HR: 73 (23 Nov 2022 08:53) (61 - 81)  BP: 132/89 (23 Nov 2022 08:53) (128/85 - 166/99)  BP(mean): 99 (22 Nov 2022 21:30) (98 - 104)  RR: 18 (23 Nov 2022 08:53) (18 - 23)  SpO2: 95% (23 Nov 2022 08:53) (95% - 98%)    Parameters below as of 23 Nov 2022 08:53  Patient On (Oxygen Delivery Method): room air            Constitutional: awake and alert.  HEENT: PERRLA, EOMI,   Neck: Supple.  Respiratory: Breath sounds are clear bilaterally  Cardiovascular: S1 and S2, regular / irregular rhythm  Gastrointestinal: soft, nontender  Extremities:  no edema  Vascular: Caritid Bruit - no  Musculoskeletal: no joint swelling/tenderness, no abnormal movements  Skin: No rashes    Neurological exam:  HF: A x O x 3. Appropriately interactive, normal affect. Speech fluent, No Aphasia or paraphasic errors. Naming /repetition intact   CN: NOLVIA, EOMI, VFF, facial sensation normal, subtle decreased nasolabial fold on left,  tongue midline, Palate moves equally, SCM equal bilaterally  Motor: No pronator drift, Strength 5/5 in all 4 ext, normal bulk and tone, no tremor, rigidity or bradykinesia.    Sens: Intact to light touch   Reflexes: Symmetric and normal . BJ 1+, BR 1+, KJ 1+,  downgoing toes b/l  Coord:  No FNFA, dysmetria, LAKESHIA intact   Gait/Balance: Not tested    NIHSS: 0          Labs:   11-23    139  |  106  |  25<H>  ----------------------------<  142<H>  3.9   |  29  |  0.96    Ca    9.0      23 Nov 2022 09:48    TPro  7.1  /  Alb  2.8<L>  /  TBili  0.7  /  DBili  x   /  AST  42<H>  /  ALT  58  /  AlkPhos  131<H>  11-22                              12.7   7.02  )-----------( 436      ( 23 Nov 2022 09:48 )             38.4       Radiology:  CT head, CTA head and neck, CT perfusion 11/22/22:  CT PERFUSION demonstrated: No core infarct. No active ischemia.  If symptoms persist consider follow up head CT or MRI, MRA  if no   contraindication.    CTA COW:  Cannot exclude tiny embolus in the junction of the left A1-A2   segment of the TOM, images 601- 48 to 51.    CTA NECK: Patent, ECAs, ICAs, no  hemodynamically significant stenosis at    ICA origins by NASCET criteria.  Bilateral vertebral arteries are patent without flow limiting stenosis.      MRI head 11/23/22:  No acute territorial infarcts seen.  Subtle area of T2 prolongation with increased signal on diffusion   weighted sequence is seen involving the medial aspect of the right   cerebellar region. No corresponding area of increased signal seen on the   ADC mapping. This could be compatible with subacute infarct though   continued close infarct is recommended as an infarct would be expected   change in short period of time on MRI.

## 2022-11-23 NOTE — DISCHARGE NOTE NURSING/CASE MANAGEMENT/SOCIAL WORK - NSDCPEFALRISK_GEN_ALL_CORE
For information on Fall & Injury Prevention, visit: [email or web address] OR  [email or web address] OR  https://www.cdc.gov/steadi/patient.html

## 2022-11-23 NOTE — PHYSICAL THERAPY INITIAL EVALUATION ADULT - GENERAL OBSERVATIONS, REHAB EVAL
Pt was found lying in bed with tele on, Pt is pleasant and willing to participate in PT, c/o left eye blurry vision-wants to see an ophthalmologist, RN made aware

## 2022-11-23 NOTE — H&P ADULT - NSHPSOCIALHISTORY_GEN_ALL_CORE
Lives with his wife. His daughter lives in [place]. Independent with ADLs. Was a . Denies smoking, alcohol, drug use.

## 2022-11-23 NOTE — PHYSICAL THERAPY INITIAL EVALUATION ADULT - PERTINENT HX OF CURRENT PROBLEM, REHAB EVAL
Pt presented with blurry vision of the left eye that started at 9 am the day of admission. Pt denies weakness of the extremities, States that he had a mild left facial droop and difficulty getting words out at times for years. The pt was discharged from [facility] 2 days ago. he was initially admitted for PNA and then had a cardiac catheterization with 2 stents placed in the LAD and 1 stent placed in the LCx. He had a subsequent pericardial effusion and underwent pericardial drain with 500 cc removed (fluid was post viral and post cath), also had a left thoracentesis with removal of 1300 ml of fluid, CT Head: Possible lacunar infarct right caudate head nucleus age-indeterminate.  Old left basal ganglia lacunar infarct.

## 2022-11-23 NOTE — DISCHARGE NOTE NURSING/CASE MANAGEMENT/SOCIAL WORK - PATIENT PORTAL LINK FT
You can access the FollowMyHealth Patient Portal offered by [facility] by registering at the following website: [email or web address]. By joining [facility]’s FollowMyHealth portal, you will also be able to view your health information using other applications (apps) compatible with our system.

## 2022-11-23 NOTE — PROVIDER CONTACT NOTE (OTHER) - SITUATION
DR. RAJPUT'S ANSWERING SERVICE AWARE OF CONSULT. SPOKE WITH MAX.
DR. SAENZ'S ANSWERING SERVICE AWARE OF CONSULT. SPOKE WITH MAX.

## 2022-12-07 DIAGNOSIS — R29.810 FACIAL WEAKNESS: ICD-10-CM

## 2022-12-07 DIAGNOSIS — R73.9 HYPERGLYCEMIA, UNSPECIFIED: ICD-10-CM

## 2022-12-07 DIAGNOSIS — I48.20 CHRONIC ATRIAL FIBRILLATION, UNSPECIFIED: ICD-10-CM

## 2022-12-07 DIAGNOSIS — D53.9 NUTRITIONAL ANEMIA, UNSPECIFIED: ICD-10-CM

## 2022-12-07 DIAGNOSIS — R29.702 NIHSS SCORE 2: ICD-10-CM

## 2022-12-07 DIAGNOSIS — I25.2 OLD MYOCARDIAL INFARCTION: ICD-10-CM

## 2022-12-07 DIAGNOSIS — Z95.5 PRESENCE OF CORONARY ANGIOPLASTY IMPLANT AND GRAFT: ICD-10-CM

## 2022-12-07 DIAGNOSIS — Z20.822 CONTACT WITH AND (SUSPECTED) EXPOSURE TO COVID-19: ICD-10-CM

## 2022-12-07 DIAGNOSIS — I44.7 LEFT BUNDLE-BRANCH BLOCK, UNSPECIFIED: ICD-10-CM

## 2022-12-07 DIAGNOSIS — I63.422 CEREBRAL INFARCTION DUE TO EMBOLISM OF LEFT ANTERIOR CEREBRAL ARTERY: ICD-10-CM

## 2022-12-07 DIAGNOSIS — H34.8122 CENTRAL RETINAL VEIN OCCLUSION, LEFT EYE, STABLE: ICD-10-CM

## 2022-12-07 DIAGNOSIS — Z79.2 LONG TERM (CURRENT) USE OF ANTIBIOTICS: ICD-10-CM

## 2022-12-07 DIAGNOSIS — I50.32 CHRONIC DIASTOLIC (CONGESTIVE) HEART FAILURE: ICD-10-CM

## 2022-12-07 DIAGNOSIS — Z79.899 OTHER LONG TERM (CURRENT) DRUG THERAPY: ICD-10-CM

## 2022-12-07 DIAGNOSIS — Z88.2 ALLERGY STATUS TO SULFONAMIDES: ICD-10-CM

## 2022-12-07 DIAGNOSIS — Z79.01 LONG TERM (CURRENT) USE OF ANTICOAGULANTS: ICD-10-CM

## 2022-12-07 DIAGNOSIS — I25.10 ATHEROSCLEROTIC HEART DISEASE OF NATIVE CORONARY ARTERY WITHOUT ANGINA PECTORIS: ICD-10-CM

## 2022-12-07 DIAGNOSIS — J18.9 PNEUMONIA, UNSPECIFIED ORGANISM: ICD-10-CM

## 2022-12-07 DIAGNOSIS — E78.5 HYPERLIPIDEMIA, UNSPECIFIED: ICD-10-CM

## 2022-12-07 DIAGNOSIS — R74.8 ABNORMAL LEVELS OF OTHER SERUM ENZYMES: ICD-10-CM

## 2022-12-07 DIAGNOSIS — Z86.73 PERSONAL HISTORY OF TRANSIENT ISCHEMIC ATTACK (TIA), AND CEREBRAL INFARCTION WITHOUT RESIDUAL DEFICITS: ICD-10-CM

## 2022-12-07 DIAGNOSIS — C85.10 UNSPECIFIED B-CELL LYMPHOMA, UNSPECIFIED SITE: ICD-10-CM

## 2022-12-07 DIAGNOSIS — E88.09 OTHER DISORDERS OF PLASMA-PROTEIN METABOLISM, NOT ELSEWHERE CLASSIFIED: ICD-10-CM

## 2022-12-07 DIAGNOSIS — D75.839 THROMBOCYTOSIS, UNSPECIFIED: ICD-10-CM

## 2023-02-27 NOTE — CONSULT NOTE ADULT - ASSESSMENT
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 Pt denies weakness of the extremities, decreased sensation, slurred speech. States that he had a mild left facial droop and difficulty getting words out at times for years. The pt was discharged from [facility] 2 days ago. he was initially admitted for PNA and then had a cardiac catheterization with 2 stents placed in the LAD and 1 stent placed in the LCx. He had a subsequent pericardial effusion and underwent pericardial drain with 500 cc removed (fluid was post viral and post cath), also had a left thoracentesis with removal of 1300 ml of fluid. He was off his coumadin for some time, but recently restarted. On 11/22 his INR was 1.79,    Blurry vision  -No acute infarct seen on MRI brain  -There was a question of small embolus in left A1-A2 segment of the TOM. He was nto a candidate for tpa due to time of onset and thrombectomy not considered due to low NIH stroke scale score.  -Possible central retinal artery occlusion in the setting of subtherapeutic INR as he was off Coumadin for several days.  -INR is now 2.04  -Appointment obtained for outpatient ophthalmology today.  -f/u MRI as outpatient due to question of subacute infarct in right medial cerebellum.  -Continue statin    Discussed with patient and Dr. Curtis. Patient Reported Weight In Pounds (Required For Calculation): 142

## 2023-05-05 NOTE — DISCHARGE NOTE PROVIDER - PROVIDER TOKENS
Patient : Michelle Washburn Age: 40 year old Sex: female   MRN: [id number] Encounter Date: 5/5/2023    TRISH/TRISH    History     Chief Complaint   Patient presents with   • Shortness of Breath   • Chest Pain Adult       HPI  5/5/2023  1:25 PM Michelle Washburn is a 40 year old female who presents to the ED for evaluation of ongoing chest pain and SOB that began 1 week ago. The patient notes that she was recently admitted and \"I took off last night.\" The patient reports she continues to have CP, SOB, fevers, cough and chronic numbness to bilateral feet. The patient denies extremity pain, headache, new injuries or any other new sx.    Collaboration with pt's : The hospitalist called him and insisted the patient return to the ED as she is very ill.     There are no further complaints or modifying factors at this time.    PCP: Pcp, No    Chart Review: I reviewed the patient's medications, allergies, and past medical and surgical history in Epic.    I have reviewed Michelle Washburn's previous discharge summary from 5/3/2023 and office visit note from 5/3/2023.  Note Review Summary:     Discharge Summary  \"I did not find the patient in her room this morning.  Discussed with the HUC- informed me that the patient left against medical advice/eloped in the middle of the night.       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  She was started on empiric antibiotics for suspected community-acquired pneumonia.  Initially remained in the ICU later transferred to the floor     We did find that patient has metapneumovirus and adeno virus.  We were planning to start Suboxone once fully withdrawn.  TAURUS was improving prior to discharge as well as liver enzyme.     I called the patient and patient's partner picked up the phone--patient was not available to talk to me but patient's partner has promised that he will relay my message for the patient and  probably bring the patient to the emergency department if she agrees.\"    Hospital Course from Dr. Melendez's progress note:  Michelle Washburn is a 40 year old female who presented to the ED 4/30 after a questionable fall. Labs notable for markedly elevated liver enzymes suggestive of ischemic acute hepatitis (AST/ALT 7660/3302), none traumatic rhabdomyolysis (CPK 4800), TAURUS, elevated troponin, and leukocytosis. She was started on empiric antibiotics. Liver doppler with patent vasculature and CBD dilation of 9 mm. Limited ECHO with normal LV/RV function.  NM scan with low probability of PE.       Patient was transferred out of ICU on 05/01--on the floor patient was noted to be hypoxic requiring at least 4-5 L of oxygen to keep saturation above 92%.  She was continued on empiric antibiotic for possible community-acquired pneumonia/aspiration pneumonia.  Allergies   Allergen Reactions   • Morphine HIVES   • Prednisone SWELLING     Patient reports taking prednisone since 2013 with no issues       No current facility-administered medications for this encounter.     Current Outpatient Medications   Medication Sig   • hydrOXYzine (VISTARIL) 50 MG capsule Take  mg by mouth at bedtime as needed (sleep).   • ibuprofen (MOTRIN) 600 MG tablet Take 600 mg by mouth every 8 hours as needed for Pain.   • nalTREXone (REVIA) 50 MG tablet Take 50 mg by mouth at bedtime.   • Vivitrol 380 MG injection Inject 380 mg into the muscle every 28 days.   • PARoxetine (PAXIL) 30 MG tablet Take 30 mg by mouth daily.   • prazosin (MINIPRESS) 1 MG capsule Take 1 mg by mouth nightly.   • risperiDONE (RisperDAL) 1 MG tablet Take 1 mg by mouth at bedtime.   • buprenorphine-naLOXone (SUBOXONE FILM) 8-2 MG sublingual film Place 2 each under the tongue every 12 hours.   • gabapentin (NEURONTIN) 300 MG capsule Take 1 capsule by mouth 3 times daily.   • naLOXone (NARCAN) 4 MG/0.1ML nasal spray Spray the content of 1 device into 1 nostril. Call 911.  May repeat with 2nd device in alternate nostril if no response in 2-3 minutes.       Past Medical History:   Diagnosis Date   • Anemia associated with acute blood loss    • Anxiety     panic attacks   • Depression    • Dizziness    • Drug abuse (CMD)    • GERD (gastroesophageal reflux disease)     s/p fundoplication   • Hepatitis 4/29/2023   • Hepatitis C antibody test positive    • History of Lyme disease    • Hodgkin disease (CMD) 2001    ABVD-chemotherapy   • Mental disorder    • Pneumonia    • Polysubstance abuse (CMD)    • PONV (postoperative nausea and vomiting)     nausea with sedatives   • RAD (reactive airway disease)    • Stomach pain     bowel pain   • Syncope        Past Surgical History:   Procedure Laterality Date   • Cardiac cath lab     • Colonoscopy w biopsy  1/20/2011   • Ercp  12/5/2011   • Esophagogastroduodenoscopy transoral flex w/endo us exam  9/2011   • Tonsillectomy and adenoidectomy         Family History   Problem Relation Age of Onset   • Cancer Mother    • Diabetes Paternal Grandfather    • Heart disease Paternal Grandfather        Social History     Tobacco Use   • Smoking status: Every Day     Current packs/day: 1.50     Average packs/day: 1.5 packs/day for 16.0 years (24.0 pk-yrs)     Types: Cigarettes   • Smokeless tobacco: Never   • Tobacco comments:     will be trying in January   Substance Use Topics   • Alcohol use: Not Currently     Comment: \"I did today\"   • Drug use: Yes     Types: Cocaine, Heroin, Prescription Drugs, Other, Marijuana       Review of Systems     Review of Systems   Constitutional: Positive for fever.   HENT: Negative for congestion and rhinorrhea.    Respiratory: Positive for cough and shortness of breath.    Cardiovascular: Positive for chest pain.   Gastrointestinal: Negative for abdominal pain, diarrhea and vomiting.   Endocrine: Negative for polyuria.   Genitourinary: Negative for dysuria and frequency.   Musculoskeletal: Negative for arthralgias, back  pain and myalgias.   Skin: Negative for rash.   Allergic/Immunologic:        Negative for medication allergies other than listed above   Neurological: Positive for numbness (chornic, to bilateral feet). Negative for weakness and headaches.       Physical Exam     ED Triage Vitals [05/05/23 1319]   ED Triage Vitals Group      Temp 98.7 °F (37.1 °C)      Heart Rate 94      Resp 16      BP (!) 160/95      SpO2 96 %      EtCO2 mmHg       Height       Weight       Weight Scale Used       BMI (Calculated)       IBW/kg (Calculated)        Physical Exam  Vitals and nursing note reviewed.   Constitutional:       General: She is not in acute distress.  HENT:      Head: Normocephalic and atraumatic.      Right Ear: External ear normal.      Left Ear: External ear normal.      Nose: Nose normal.   Eyes:      General:         Right eye: No discharge.         Left eye: No discharge.      Conjunctiva/sclera: Conjunctivae normal.   Cardiovascular:      Rate and Rhythm: Normal rate and regular rhythm.      Heart sounds: Normal heart sounds. No murmur heard.     No friction rub. No gallop.   Pulmonary:      Effort: Pulmonary effort is normal. No respiratory distress.      Breath sounds: Rales (bibasilar) present. No wheezing or rhonchi.   Abdominal:      General: Abdomen is flat. There is no distension.      Palpations: Abdomen is soft.      Tenderness: There is no abdominal tenderness. There is no guarding.   Musculoskeletal:         General: No deformity or signs of injury.      Cervical back: Normal range of motion. No rigidity.   Skin:     General: Skin is dry.      Coloration: Skin is not pale.   Neurological:      Mental Status: She is alert. Mental status is at baseline.      GCS: GCS eye subscore is 4. GCS verbal subscore is 5. GCS motor subscore is 6.   Psychiatric:         Speech: Speech normal.         Behavior: Behavior is cooperative.         Procedures     Procedures    Lab Results     Results for orders placed or  performed during the hospital encounter of 05/05/23   Basic Metabolic Panel   Result Value Ref Range    Fasting Status      Sodium 137 135 - 145 mmol/L    Potassium 3.2 (L) 3.4 - 5.1 mmol/L    Chloride 104 97 - 110 mmol/L    Carbon Dioxide 29 21 - 32 mmol/L    Anion Gap 7 7 - 19 mmol/L    Glucose 91 70 - 99 mg/dL    BUN 17 6 - 20 mg/dL    Creatinine 1.16 (H) 0.51 - 0.95 mg/dL    Glomerular Filtration Rate 60 >=60    BUN/Cr 15 7 - 25    Calcium 8.6 8.4 - 10.2 mg/dL   Prothrombin Time   Result Value Ref Range    Prothrombin Time 10.6 9.7 - 11.8 sec    INR 1.0     Hepatic Function Panel   Result Value Ref Range    Albumin 3.6 3.6 - 5.1 g/dL    Bilirubin, Total 0.5 0.2 - 1.0 mg/dL    Bilirubin, Direct 0.1 0.0 - 0.2 mg/dL    Alkaline Phosphatase 111 45 - 117 Units/L    GPT/ (H) <64 Units/L    GOT/ (H) <=37 Units/L    Protein, Total 8.3 (H) 6.4 - 8.2 g/dL   Lipase   Result Value Ref Range    Lipase 191 73 - 393 Units/L   Magnesium   Result Value Ref Range    Magnesium 1.4 (L) 1.7 - 2.4 mg/dL   Creatine Kinase   Result Value Ref Range     (H) 26 - 192 Units/L   Urinalysis & Reflex Microscopy With Culture If Indicated   Result Value Ref Range    COLOR, URINALYSIS Straw     APPEARANCE, URINALYSIS Clear     GLUCOSE, URINALYSIS Negative Negative mg/dL    BILIRUBIN, URINALYSIS Negative Negative    KETONES, URINALYSIS Negative Negative mg/dL    SPECIFIC GRAVITY, URINALYSIS 1.010 1.005 - 1.030    OCCULT BLOOD, URINALYSIS Negative Negative    PH, URINALYSIS 7.0 5.0 - 7.0    PROTEIN, URINALYSIS Trace (A) Negative mg/dL    UROBILINOGEN, URINALYSIS 0.2 0.2, 1.0 mg/dL    NITRITE, URINALYSIS Negative Negative    LEUKOCYTE ESTERASE, URINALYSIS Negative Negative   CBC with Automated Differential (performable only)   Result Value Ref Range    WBC 8.6 4.2 - 11.0 K/mcL    RBC 3.91 (L) 4.00 - 5.20 mil/mcL    HGB 12.3 12.0 - 15.5 g/dL    HCT 37.6 36.0 - 46.5 %    MCV 96.2 78.0 - 100.0 fl    MCH 31.5 26.0 - 34.0 pg    MCHC  32.7 32.0 - 36.5 g/dL    RDW-CV 12.3 11.0 - 15.0 %    RDW-SD 43.2 39.0 - 50.0 fL     140 - 450 K/mcL    NRBC 0 <=0 /100 WBC    Neutrophil, Percent 61 %    Lymphocytes, Percent 29 %    Mono, Percent 8 %    Eosinophils, Percent 1 %    Basophils, Percent 0 %    Immature Granulocytes 1 %    Absolute Neutrophils 5.3 1.8 - 7.7 K/mcL    Absolute Lymphocytes 2.5 1.0 - 4.8 K/mcL    Absolute Monocytes 0.7 0.3 - 0.9 K/mcL    Absolute Eosinophils  0.1 0.0 - 0.5 K/mcL    Absolute Basophils 0.0 0.0 - 0.3 K/mcL    Absolute Immature Granulocytes 0.1 0.0 - 0.2 K/mcL   Phosphorus   Result Value Ref Range    Phosphorus 3.2 2.4 - 4.7 mg/dL   Procalcitonin   Result Value Ref Range    Procalcitonin 0.12 (H) <=0.09 ng/mL   ISTAT8 VENOUS  POINT OF CARE   Result Value Ref Range    BUN - POINT OF CARE 16 6 - 20 mg/dL    SODIUM - POINT OF CARE 142 135 - 145 mmol/L    POTASSIUM - POINT OF CARE 3.2 (L) 3.4 - 5.1 mmol/L    CHLORIDE - POINT OF CARE 100 97 - 110 mmol/L    TCO2 - POINT OF CARE 29 (H) 19 - 24 mmol/L    ANION GAP - POINT OF CARE 18 7 - 19 mmol/L    HEMATOCRIT - POINT OF CARE 40.0 36.0 - 46.5 %    HEMOGLOBIN - POINT OF CARE 13.6 12.0 - 15.5 g/dL    GLUCOSE - POINT OF CARE 89 70 - 99 mg/dL    CALCIUM, IONIZED - POINT OF CARE 1.06 (L) 1.15 - 1.29 mmol/L    Creatinine 1.30 (H) 0.51 - 0.95 mg/dL    Glomerular Filtration Rate 53 (L) >=60   BLOOD GAS, VENOUS -POINT OF CARE   Result Value Ref Range    PH, VENOUS - POINT OF CARE 7.46 (H) 7.35 - 7.45 Units    PCO2, VENOUS - POINT OF CARE 44 38 - 51 mm Hg    PO2, VENOUS - POINT OF CARE 19 (L) 35 - 42 mm Hg    HCO3, VENOUS - POINT OF CARE 31 (H) 22 - 28 mmol/L    BASE EXCESS / DEFICIT, VENOUS - POINT OF CARE 6 (H) -2 - 2 mmol/L    O2 SATURATION, VENOUS - POINT OF CARE 32 (L) 60 - 80 %    TCO2 - POINT OF CARE 32 (H) 19 - 24 mmol/L   TROPONIN I  POINT OF CARE   Result Value Ref Range    TROPONIN I - POINT OF CARE <0.10 <0.10 ng/mL       EKG     [facility]use EKG report   Results for orders placed or  performed during the hospital encounter of 05/05/23   Electrocardiogram 12-Lead   Result Value Ref Range    Ventricular Rate EKG/Min (BPM) 86     Atrial Rate (BPM) 86     TX-Interval (MSEC) 154     QRS-Interval (MSEC) 74     QT-Interval (MSEC) 396     QTc 474     P Axis (Degrees) 83     R Axis (Degrees) 18     T Axis (Degrees) 73     REPORT TEXT       Normal sinus rhythm  Normal ECG  When compared with ECG of  03-MAY-2023 21:21,  No significant change was found  Confirmed by ALIZA MORE MD ([id number]),  Amber English ([id number]) on 5/5/2023 1:47:05 PM         Radiology Results     Imaging Results          XR Chest AP or PA (Final result)  Result time 05/05/23 15:08:39    Final result                 Impression:    IMPRESSION:     Bilateral peribronchial thickening is again identified with slight interval  improvement in basilar and perihilar interstitial thickening suggesting  mild interval improvement in bronchopneumonia with persistent  bronchiolitis. No new focal consolidation.             Narrative:    XR CHEST AP OR PA    HISTORY: Cough.    COMPARISON: 5/1/2023    TECHNIQUE:  Single, AP upright view of the chest.    FINDINGS:    The cardiomediastinal silhouette and pulmonary vasculature are within  normal limits.    Bilateral peribronchial thickening is again evident. Interstitial  thickening within the perihilar regions and lower lungs has slightly  improved compared to the prior radiograph. No new focal consolidation.    No evidence of pneumothorax or pleural effusion.                                ED Medications     Medications   sodium chloride 0.9 % flush bag 25 mL (has no administration in time range)   sodium chloride (PF) 0.9 % injection 2 mL (has no administration in time range)   magnesium sulfate 2 g in 50 mL premix IVPB (2 g Intravenous New Bag 5/5/23 0804)   potassium CHLORIDE (KLOR-CON M) elizabeth ER tablet 40 mEq (40 mEq Oral Not Given 5/5/23 1649)   cefTRIAXone (ROCEPHIN) syringe 2,000 mg  (has no administration in time range)   cefTRIAXone (ROCEPHIN) syringe 2,000 mg (has no administration in time range)   hydrOXYzine (ATARAX) tablet 50 mg (has no administration in time range)   lactated ringers bolus 1,000 mL (1,000 mLs Intravenous New Bag 5/5/23 1707)   doxycycline (VIBRAMYCIN) 100 mg in sodium chloride 0.9 % 100 mL IVPB (has no administration in time range)   LORazepam (ATIVAN) injection 0.5 mg (0.5 mg Intravenous Given 5/5/23 1421)   ketorolac (TORADOL) injection 15 mg (15 mg Intravenous Given 5/5/23 1702)         ED Course     Vitals:    05/05/23 1319   BP: (!) 160/95   BP Location: LUE - Left upper extremity   Patient Position: Sitting/High-Villarreal's   Pulse: 94   Resp: 16   Temp: 98.7 °F (37.1 °C)   TempSrc: Oral   SpO2: 96%   LMP: 04/15/2023       ED Course as of 05/05/23 1719   Fri May 05, 2023   1330 Due to a boarding crisis, high volume and high acuity in the emergency department, this patient was seen in an initial intake evaluation room.   [MS]   4004 Initial Impression:  40-year-old with history of polysubstance use and recent admission for nontraumatic rhabdomyolysis and hepatitis presenting back to the emergency department after leaving [place].  Has known pneumonia, metapneumovirus, adenovirus positive, although possible bacterial superimposed, also was continued to be followed for resolving nontraumatic rhabdomyolysis and hepatitis.  Likely will need readmission to the hospital for continued management of these multiple processes.  Will obtain lab evaluation initially and repeat chest x-ray, anticipate likely admission. [MC]   1525 I have discussed the case with the Hospitalist provider.   We discussed the pertinent history, physical, diagnostic studies and the ED management of the patient.  The plan is to admit the patient for further care beyond the ED. [JA]      ED Course User Index  [JA] Meir Freeman  [MC] Arron Clark MD  [MS] Amber Lomax       Radiology Review: I  FREE:[LAST:[PMD],PHONE:[(   )    -],FAX:[(   )    -],FOLLOWUP:[1-3 days]] have independently interpreted the Chest X-Ray and have found minimal bilateral opacities.           Consults                ED Consults done in the ED course    MDM                            MDM done in ED Course        Does the Patient have sepsis: NO     Critical Care       No Critical Care        Disposition       Clinical Impression and Diagnosis  5:18 PM 05/05/23     Diagnosis:   1. Pneumonia of both lower lobes due to infectious organism           Pt to be admitted to Dr. Brown     Condition on Admission: Stable    05/05/23          Admit 5/5/2023  3:27 PM  Telemetry Bed?: Yes  Admitting Physician: BROOKE BROWN [[id number]]  Transferring Patient to? Only adjust for transfers between [facility] ([facility], [facility], [facility]). **PLEASE ONLY USE BUTTON OPTIONS**: [facility] [[id number]]  Is this a telephone or verbal order?: This is a telephone order from the admitting physician          I have reviewed the information recorded by the scribe for accuracy and agree with its contents.    ____________________________________________________________________    Amber Lomax acting as a scribe for Dr. Arron Clark  Dictation # [id number]  Scribe: Arron Beaver MD  05/05/23 0223

## 2023-12-01 NOTE — ED PROVIDER NOTE - DURATION
Luana Marion (:  2000) is a Established patient, here for evaluation of the following: Follow-up      Assessment & Plan   Below is the assessment and plan developed based on review of pertinent history, physical exam, labs, studies, and medications. 1. Anxiety and depression  -     citalopram (CELEXA) 20 MG tablet; Take 1 tablet by mouth daily, Disp-90 tablet, R-2Normal    Symptoms still uncontrolled. Will increase Celexa to 20mg daily. Follow up in 3-4 weeks if still feels symptoms uncontrolled. Subjective   States she feels she is doing okay on the Celexa. States it does not seem to be doing a lot for anxiety yet. Feels like it is helping with depressed mood but sometimes having breakthrough symptoms. Was feeling really hot when she first started the medicine and some random arthralgias/ myalgias, insomnia when she first started that has resolved. She would be interested in going up on dose. Denies SI/ HI. States she had LP 2 days ago. Has not talked with Neurologist yet but did have high ICP with this. Has follow up in .       There is no problem list on file for this patient. Current Outpatient Medications on File Prior to Visit   Medication Sig Dispense Refill    SUMAtriptan (IMITREX) 100 MG tablet Take 1 tab at onset of severe headache; may repeat another in 2 hours if headaches persist 9 tablet 3    meclizine (ANTIVERT) 25 MG tablet TAKE 1 TABLET BY MOUTH THREE TIMES A DAY AS NEEDED FOR DIZZINESS      butalbital-acetaminophen-caffeine (FIORICET, ESGIC) -40 MG per tablet TAKE 1 TABLET BY MOUTH EVERY 8 HOURS AS NEEDED FOR HEADACHE      levonorgestrel (MIRENA, 52 MG,) IUD 52 mg 1 Device by IntraUTERine route once      rizatriptan (MAXALT) 10 MG tablet May repeat in 2 hours if needed. Do not exceed 2 tabs in 24 hours. No current facility-administered medications on file prior to visit.        Allergies   Allergen Reactions    Other Rash     Medical tape    Saxenda day(s)

## 2024-04-19 PROBLEM — I31.39 OTHER PERICARDIAL EFFUSION (NONINFLAMMATORY): Chronic | Status: ACTIVE | Noted: 2022-11-23

## 2024-04-19 PROBLEM — Z87.09 PERSONAL HISTORY OF OTHER DISEASES OF THE RESPIRATORY SYSTEM: Chronic | Status: ACTIVE | Noted: 2022-11-23

## 2024-04-19 PROBLEM — I63.9 CEREBRAL INFARCTION, UNSPECIFIED: Chronic | Status: ACTIVE | Noted: 2022-11-23

## 2024-04-23 ENCOUNTER — OUTPATIENT (OUTPATIENT)
Dept: OUTPATIENT SERVICES | Facility: HOSPITAL | Age: 84
LOS: 1 days | End: 2024-04-23
Payer: MEDICARE

## 2024-04-23 VITALS
OXYGEN SATURATION: 98 % | HEIGHT: 67 IN | DIASTOLIC BLOOD PRESSURE: 71 MMHG | RESPIRATION RATE: 16 BRPM | HEART RATE: 59 BPM | WEIGHT: 160.94 LBS | SYSTOLIC BLOOD PRESSURE: 124 MMHG | TEMPERATURE: 97 F

## 2024-04-23 DIAGNOSIS — Z98.890 OTHER SPECIFIED POSTPROCEDURAL STATES: Chronic | ICD-10-CM

## 2024-04-23 DIAGNOSIS — Z95.5 PRESENCE OF CORONARY ANGIOPLASTY IMPLANT AND GRAFT: Chronic | ICD-10-CM

## 2024-04-23 DIAGNOSIS — Z01.818 ENCOUNTER FOR OTHER PREPROCEDURAL EXAMINATION: ICD-10-CM

## 2024-04-23 DIAGNOSIS — K40.90 UNILATERAL INGUINAL HERNIA, WITHOUT OBSTRUCTION OR GANGRENE, NOT SPECIFIED AS RECURRENT: ICD-10-CM

## 2024-04-23 DIAGNOSIS — Z95.5 PRESENCE OF CORONARY ANGIOPLASTY IMPLANT AND GRAFT: ICD-10-CM

## 2024-04-23 DIAGNOSIS — Z90.89 ACQUIRED ABSENCE OF OTHER ORGANS: Chronic | ICD-10-CM

## 2024-04-23 LAB
ALBUMIN SERPL ELPH-MCNC: 3.5 G/DL — SIGNIFICANT CHANGE UP (ref 3.3–5)
ALP SERPL-CCNC: 78 U/L — SIGNIFICANT CHANGE UP (ref 40–120)
ALT FLD-CCNC: 23 U/L — SIGNIFICANT CHANGE UP (ref 12–78)
ANION GAP SERPL CALC-SCNC: 3 MMOL/L — LOW (ref 5–17)
AST SERPL-CCNC: 31 U/L — SIGNIFICANT CHANGE UP (ref 15–37)
BILIRUB SERPL-MCNC: 1.9 MG/DL — HIGH (ref 0.2–1.2)
BUN SERPL-MCNC: 20 MG/DL — SIGNIFICANT CHANGE UP (ref 7–23)
CALCIUM SERPL-MCNC: 9 MG/DL — SIGNIFICANT CHANGE UP (ref 8.5–10.1)
CHLORIDE SERPL-SCNC: 106 MMOL/L — SIGNIFICANT CHANGE UP (ref 96–108)
CO2 SERPL-SCNC: 32 MMOL/L — HIGH (ref 22–31)
CREAT SERPL-MCNC: 1.2 MG/DL — SIGNIFICANT CHANGE UP (ref 0.5–1.3)
EGFR: 59 ML/MIN/1.73M2 — LOW
GLUCOSE SERPL-MCNC: 90 MG/DL — SIGNIFICANT CHANGE UP (ref 70–99)
HCT VFR BLD CALC: 41 % — SIGNIFICANT CHANGE UP (ref 39–50)
HGB BLD-MCNC: 13.5 G/DL — SIGNIFICANT CHANGE UP (ref 13–17)
MCHC RBC-ENTMCNC: 32.9 GM/DL — SIGNIFICANT CHANGE UP (ref 32–36)
MCHC RBC-ENTMCNC: 33.7 PG — SIGNIFICANT CHANGE UP (ref 27–34)
MCV RBC AUTO: 102.2 FL — HIGH (ref 80–100)
NRBC # BLD: 0 /100 WBCS — SIGNIFICANT CHANGE UP (ref 0–0)
PLATELET # BLD AUTO: 163 K/UL — SIGNIFICANT CHANGE UP (ref 150–400)
POTASSIUM SERPL-MCNC: 5 MMOL/L — SIGNIFICANT CHANGE UP (ref 3.5–5.3)
POTASSIUM SERPL-SCNC: 5 MMOL/L — SIGNIFICANT CHANGE UP (ref 3.5–5.3)
PROT SERPL-MCNC: 6.5 G/DL — SIGNIFICANT CHANGE UP (ref 6–8.3)
RBC # BLD: 4.01 M/UL — LOW (ref 4.2–5.8)
RBC # FLD: 12.2 % — SIGNIFICANT CHANGE UP (ref 10.3–14.5)
SODIUM SERPL-SCNC: 141 MMOL/L — SIGNIFICANT CHANGE UP (ref 135–145)
WBC # BLD: 4.02 K/UL — SIGNIFICANT CHANGE UP (ref 3.8–10.5)
WBC # FLD AUTO: 4.02 K/UL — SIGNIFICANT CHANGE UP (ref 3.8–10.5)

## 2024-04-23 PROCEDURE — G0463: CPT

## 2024-04-23 PROCEDURE — 36415 COLL VENOUS BLD VENIPUNCTURE: CPT

## 2024-04-23 PROCEDURE — 80053 COMPREHEN METABOLIC PANEL: CPT

## 2024-04-23 PROCEDURE — 86900 BLOOD TYPING SEROLOGIC ABO: CPT

## 2024-04-23 PROCEDURE — 86850 RBC ANTIBODY SCREEN: CPT

## 2024-04-23 PROCEDURE — 86901 BLOOD TYPING SEROLOGIC RH(D): CPT

## 2024-04-23 PROCEDURE — 85027 COMPLETE CBC AUTOMATED: CPT

## 2024-04-23 RX ORDER — FUROSEMIDE 40 MG
1 TABLET ORAL
Qty: 0 | Refills: 0 | DISCHARGE

## 2024-04-23 RX ORDER — WARFARIN SODIUM 2.5 MG/1
1 TABLET ORAL
Qty: 0 | Refills: 0 | DISCHARGE

## 2024-04-23 RX ORDER — POLYETHYLENE GLYCOL 3350 17 G/17G
17 POWDER, FOR SOLUTION ORAL
Qty: 0 | Refills: 0 | DISCHARGE

## 2024-04-23 RX ORDER — TAMSULOSIN HYDROCHLORIDE 0.4 MG/1
1 CAPSULE ORAL
Qty: 0 | Refills: 0 | DISCHARGE

## 2024-04-23 RX ORDER — PRASUGREL 5 MG/1
1 TABLET, FILM COATED ORAL
Qty: 0 | Refills: 0 | DISCHARGE

## 2024-04-23 RX ORDER — FINASTERIDE 5 MG/1
1 TABLET, FILM COATED ORAL
Qty: 0 | Refills: 0 | DISCHARGE

## 2024-04-23 NOTE — H&P PST ADULT - BLOOD TRANSFUSION, PREVIOUS, PROFILE
Child's Well Visit, 9 to 11 Years: Care Instructions  Your Care Instructions     Your child is growing quickly and is more mature than in his or her younger years. Your child will want more freedom and responsibility. But your child still needs you to set limits and help guide his or her behavior. You also need to teach your child how to be safe when away from home. In this age group, most children enjoy being with friends. They are starting to become more independent and improve their decision-making skills. While they like you and still listen to you, they may start to show irritation with or lack of respect for adults in charge. Follow-up care is a key part of your child's treatment and safety. Be sure to make and go to all appointments, and call your doctor if your child is having problems. It's also a good idea to know your child's test results and keep a list of the medicines your child takes. How can you care for your child at home? Eating and a healthy weight  · Encourage healthy eating habits. Most children do well with three meals and one to two snacks a day. Offer fruits and vegetables at meals and snacks. · Let your child decide how much to eat. Give children foods they like but also give new foods to try. If your child is not hungry at one meal, it is okay to wait until the next meal or snack to eat. · Check in with your child's school or day care to make sure that healthy meals and snacks are given. · Limit fast food. Help your child with healthier food choices when you eat out. · Offer water when your child is thirsty. Do not give your child more than 8 oz. of fruit juice per day. Juice does not have the valuable fiber that whole fruit has. Do not give your child soda pop. · Make meals a family time. Have nice conversations at mealtime and turn the TV off. · Do not use food as a reward or punishment for your child's behavior. Do not make your children \"clean their plates. \"  · Let all your children know that you love them whatever their size. Help children feel good about their bodies. Remind your child that people come in different shapes and sizes. Do not tease or nag children about their weight, and do not say your child is skinny, fat, or chubby. · Set limits on watching TV or video. Research shows that the more TV children watch, the higher the chance that they will be overweight. Do not put a TV in your child's bedroom, and do not use TV and videos as a . Healthy habits  · Encourage your child to be active for at least one hour each day. Plan family activities, such as trips to the park, walks, bike rides, swimming, and gardening. · Do not smoke or allow others to smoke around your child. If you need help quitting, talk to your doctor about stop-smoking programs and medicines. These can increase your chances of quitting for good. Be a good model so your child will not want to try smoking. Parenting  · Set realistic family rules. Give children more responsibility when they seem ready. Set clear limits and consequences for breaking the rules. · Have children do chores that stretch their abilities. · Reward good behavior. Set rules and expectations, and reward your child when they are followed. For example, when the toys are picked up, your child can watch TV or play a game; when your child comes home from school on time, your child can have a friend over. · Pay attention when your child wants to talk. Try to stop what you are doing and listen. Set some time aside every day or every week to spend time alone with each child to listen to your child's thoughts and feelings. · Support children when they do something wrong. After giving your child time to think about a problem, help your child to understand the situation. For example, if your child lies to you, explain why this is not good behavior. · Help your child learn how to make and keep friends.  Teach your child how to begin an introduction, start conversations, and politely join in play. Safety  · Make sure your child wears a helmet that fits properly when riding a bike or scooter. Add wrist guards, knee pads, and gloves for skateboarding, in-line skating, and scooter riding. · Walk and ride bikes with children to make sure they know how to obey traffic lights and signs. Also, make sure your child knows how to use hand signals while riding. · Show your child that seat belts are important by wearing yours every time you drive. Have everyone in the car buckle up. · Keep the Poison Control number (7-243.919.3011) in or near your phone. · Teach your child to stay away from unknown animals and not to [place] or grab pets. · Explain the danger of strangers. It is important to teach your children to be careful around strangers and how to react when they feel threatened. Talk about body changes  · Start talking about the body changes your child will start to see. This will make it less awkward each time. Be patient. Give yourselves time to get comfortable with each other. Start the conversations. Your child may be interested but too embarrassed to ask. · Create an open environment. Let your child know that you are always willing to talk. Listen carefully. This will reduce confusion and help you understand what is truly on your child's mind. · Communicate your values and beliefs. Your child can use your values to develop their own set of beliefs. School  Tell your child why you think school is important. Show interest in your child's school. Encourage your child to join a school team or activity. If your child is having trouble with classes, you might try getting a . If your child is having problems with friends, other students, or teachers, work with your child and the school staff to find out what is wrong. Immunizations  Flu immunization is recommended once a year for all children ages 7 months and older.  At age 6 or 15, everyone should get the human papillomavirus (HPV) series of shots. A meningococcal shot is recommended at age 6 or 15. And a Tdap shot is recommended to protect against tetanus, diphtheria, and pertussis. When should you call for help? Watch closely for changes in your child's health, and be sure to contact your doctor if:    · You are concerned that your child is not growing or learning normally for his or her age.     · You are worried about your child's behavior.     · You need more information about how to care for your child, or you have questions or concerns. Where can you learn more? Go to http://www.gray.com/  Enter U816 in the search box to learn more about \"Child's Well Visit, 9 to 11 Years: Care Instructions. \"  Current as of: May 27, 2020               Content Version: 12.6  © 1418-4024 [employer]. Care instructions adapted under license by [employer] (which disclaims liability or warranty for this information). If you have questions about a medical condition or this instruction, always ask your healthcare professional. [address] any warranty or liability for your use of this information. Learning About Healthy Eating for Teens  What is healthy eating? Healthy eating means eating a variety of foods so that you get all the nutrients you need. Your body needs protein, carbohydrate, and fats for energy. They keep your heart beating, your brain active, and your muscles working. Eating a well-balanced diet will help you feel your best and give you plenty of energy for school, work, sports, or play. And it will help you reach and stay at a healthy weight. Along with giving you nutrients and energy, healthy foods also can give you pleasure. They can taste great and be good for you at the same time. How do you get started on healthy eating?   Healthy eating starts with learning new ways to eat, such as adding more fresh fruits, vegetables, and whole grains and cutting back on foods that have a lot of fat, salt, and sugar. You may be surprised at how easy it can be to eat healthy foods and how good it will make you feel. Healthy eating is not a diet. It means making changes you can live with and enjoy for the rest of your life. Healthy eating is about balance, variety, and moderation. Aim for balance   Having a well-balanced diet means that you eat enough, but not too much, and that food gives you the nutrients you need to stay healthy. So listen to your body. Eat when you're hungry. Stop when you feel satisfied. On most days, try to eat from each food group. This means eating a variety of:  · Whole grains, such as whole wheat breads and pastas. · Fruits and vegetables. · Dairy products, such as low-fat milk, yogurt, and cheese. · Lean proteins, such as all types of fish, chicken without the skin, and beans. Look for variety   Be adventurous. Choose different foods in each food group. For example, don't reach for an apple every time you choose a fruit. Eating a variety of foods each day will help you get all the nutrients you need. Practice moderation   Don't have too much or too little of one thing. All foods, if eaten in moderation, can be part of healthy eating. Even sweets can be okay. If your favorite foods are high in fat, salt, sugar, or calories, limit how often you eat them. Eat smaller servings, or look for healthy substitutes. How do you make healthy eating a habit? It can be hard to make healthy eating a habit, especially when fast food, vending-machine snacks, and processed foods are so easy to find. But it may be easier than you think. Think about some small changes you can make. You don't have to change everything at once. Here are some simple things you can do to get more of the healthy foods you need in your diet. · Use whole wheat bread instead of white bread.   · Use fat-free or low-fat milk instead of whole milk. · Eat brown rice instead of white rice, and eat whole wheat pasta instead of white-flour pasta. · Try low-fat cheeses and low-fat yogurt. · Add more fruits and vegetables to meals, and have them for snacks. · Add lettuce, tomato, cucumber, and onion to sandwiches. · Add fruit to yogurt and cereal.  You can also make healthy choices when eating out, even at fast-food restaurants. When eating out, try:  · A veggie pizza with a whole wheat crust or with grilled chicken instead of sausage or pepperoni. · Pasta with roasted vegetables, grilled chicken, or marinara sauce instead of cream sauce. · A vegetable wrap or grilled chicken wrap. · A side salad instead of fries. It's also a good idea to have healthy snacks ready for when you get hungry. Keep healthy snacks with you at school or work, in your car, and at home. If you have a healthy snack easily available, you'll be less likely to pick a candy bar or bag of chips from a vending machine instead. Some healthy snacks you might want to keep on hand are fruit, low-fat yogurt, string cheese, low-fat microwave popcorn, raisins and other dried fruit, nuts, whole wheat crackers, pretzels, carrots, celery sticks, and broccoli. Where can you learn more? Go to http://www.gray.com/  Enter W881 in the search box to learn more about \"Learning About Healthy Eating for Teens. \"  Current as of: August 22, 2019               Content Version: 12.6  © 9871-1748 [employer]. Care instructions adapted under license by [employer] (which disclaims liability or warranty for this information). If you have questions about a medical condition or this instruction, always ask your healthcare professional. [address] any warranty or liability for your use of this information. many years ago @ [facility] "in the 80s"/yes

## 2024-04-23 NOTE — H&P PST ADULT - PROBLEM SELECTOR PLAN 3
CC with Dr. Goldberg. Seen on 4/22. Office to fax. Hold Eliquis and Effient as per cardiology, 4-5 days preop and start 81 mg of ASA, continue and take DOS with all meds with sip of water. Verbalized understanding.

## 2024-04-23 NOTE — H&P PST ADULT - PHONE #
Encourage ambulation  VCD boots Encourage ambulation  VCD boots (cell) [phone] (work) [phone]

## 2024-04-23 NOTE — H&P PST ADULT - HISTORY OF PRESENT ILLNESS
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 Gordon . Reports a one year H/O right groin pain and swelling. Reports some abd pain but denies n/v, scrotal pain and swelling. Denies SOB, CP, Palps and ANDERSON.

## 2024-04-23 NOTE — H&P PST ADULT - NSICDXPASTMEDICALHX_GEN_ALL_CORE_FT
PAST MEDICAL HISTORY:  Atrial Fibrillation     CAD (coronary artery disease)     CVA (cerebrovascular accident) s/p TPA 30 years ago    Empyema of lung     H/O pleural effusion left, s/p thoracentesis 11/2022    Hyperlipidemia     Lymphoma Follicular B cell lymphoma Stage 4 treated with Rituxan in 2005    Old MI (myocardial infarction)     Pericardial effusion s/p drain 11/2022    Recurrent pneumonia     TIA (transient ischemic attack)      PAST MEDICAL HISTORY:  Atrial Fibrillation     CAD (coronary artery disease)     CVA (cerebrovascular accident) s/p TPA 30 years ago    Empyema of lung     H/O pleural effusion left, s/p thoracentesis 11/2022    Hyperlipidemia     Lymphoma Follicular B cell lymphoma Stage 4 treated with Rituxan in 2005    Old MI (myocardial infarction)     Pericardial effusion s/p drain 11/2022    Recurrent pneumonia     Stented coronary artery X 6 (1980s and 2022)    TIA (transient ischemic attack)     Unilateral inguinal hernia without obstruction or gangrene, recurrence not specified

## 2024-04-23 NOTE — H&P PST ADULT - NSICDXPASTSURGICALHX_GEN_ALL_CORE_FT
PAST SURGICAL HISTORY:  Empyema of Lung     H/O colonoscopy     History of coronary angioplasty with insertion of stent     History of prostate biopsy     History of tonsillectomy

## 2024-04-23 NOTE — H&P PST ADULT - PROBLEM SELECTOR PLAN 2
Labs  - CBC, CMP and T&S. EKG from cardiology  MC with PCP   Pre op and Hibiclens instructions reviewed and given. Take routine am meds, day of surgery with sip of water.  Hold Furosemide DOS. Instructed to hold and/or avoid other NSAIDs and OTC supplements. Tylenol is ok. Verbalized understanding

## 2024-05-01 ENCOUNTER — TRANSCRIPTION ENCOUNTER (OUTPATIENT)
Age: 84
End: 2024-05-01

## 2024-05-01 NOTE — ASU PATIENT PROFILE, ADULT - NSALCOHOLPROBLEMSRELYN_GEN_A_CORE_SD
Here are the supplements I recommend for migraine prophylaxis:  - Riboflavin 400 mg daily  - CoQ-10 100 mg twice daily  - Magnesium citrate 600 mg daily     Allergy treatment:  Daily allergy medicine (example: Claritin, Zyrtec, Allegra)  Nightly Steroid nose spray (example: Fluticasone, Flonase)  
no

## 2024-05-01 NOTE — ASU PATIENT PROFILE, ADULT - FALL HARM RISK - RISK INTERVENTIONS
Assistance OOB with selected safe patient handling equipment/Communicate Fall Risk and Risk Factors to all staff, patient, and family/Monitor for mental status changes/Move patient closer to nurses' station/Reinforce activity limits and safety measures with patient and family/Reorient to person, place and time as needed/Toileting schedule using arm’s reach rule for commode and bathroom/Use of alarms - bed, chair and/or voice tab/Visual Cue: Yellow wristband/Bed in lowest position, wheels locked, appropriate side rails in place/Call bell, personal items and telephone in reach/Instruct patient to call for assistance before getting out of bed or chair/Non-slip footwear when patient is out of bed/[place] to call system/Physically safe environment - no spills, clutter or unnecessary equipment/Purposeful Proactive Rounding/Room/bathroom lighting operational, light cord in reach

## 2024-05-01 NOTE — ASU PATIENT PROFILE, ADULT - NSICDXPASTMEDICALHX_GEN_ALL_CORE_FT
PAST MEDICAL HISTORY:  Atrial Fibrillation     CAD (coronary artery disease)     CVA (cerebrovascular accident) s/p TPA 30 years ago    Empyema of lung     H/O pleural effusion left, s/p thoracentesis 11/2022    Hyperlipidemia     Lymphoma Follicular B cell lymphoma Stage 4 treated with Rituxan in 2005    Old MI (myocardial infarction)     Pericardial effusion s/p drain 11/2022    Recurrent pneumonia     Stented coronary artery X 6 (1980s and 2022)    TIA (transient ischemic attack)     Unilateral inguinal hernia without obstruction or gangrene, recurrence not specified

## 2024-05-02 ENCOUNTER — OUTPATIENT (OUTPATIENT)
Dept: OUTPATIENT SERVICES | Facility: HOSPITAL | Age: 84
LOS: 1 days | End: 2024-05-02
Payer: MEDICARE

## 2024-05-02 ENCOUNTER — TRANSCRIPTION ENCOUNTER (OUTPATIENT)
Age: 84
End: 2024-05-02

## 2024-05-02 VITALS
HEIGHT: 67 IN | OXYGEN SATURATION: 98 % | DIASTOLIC BLOOD PRESSURE: 82 MMHG | TEMPERATURE: 97 F | RESPIRATION RATE: 16 BRPM | HEART RATE: 52 BPM | WEIGHT: 160.94 LBS | SYSTOLIC BLOOD PRESSURE: 147 MMHG

## 2024-05-02 VITALS
SYSTOLIC BLOOD PRESSURE: 130 MMHG | OXYGEN SATURATION: 96 % | DIASTOLIC BLOOD PRESSURE: 76 MMHG | RESPIRATION RATE: 18 BRPM | HEART RATE: 65 BPM

## 2024-05-02 DIAGNOSIS — Z98.890 OTHER SPECIFIED POSTPROCEDURAL STATES: Chronic | ICD-10-CM

## 2024-05-02 DIAGNOSIS — Z90.89 ACQUIRED ABSENCE OF OTHER ORGANS: Chronic | ICD-10-CM

## 2024-05-02 DIAGNOSIS — K40.90 UNILATERAL INGUINAL HERNIA, WITHOUT OBSTRUCTION OR GANGRENE, NOT SPECIFIED AS RECURRENT: ICD-10-CM

## 2024-05-02 DIAGNOSIS — Z95.5 PRESENCE OF CORONARY ANGIOPLASTY IMPLANT AND GRAFT: Chronic | ICD-10-CM

## 2024-05-02 LAB — ABO RH CONFIRMATION: SIGNIFICANT CHANGE UP

## 2024-05-02 PROCEDURE — C9399: CPT

## 2024-05-02 PROCEDURE — S2900: CPT

## 2024-05-02 PROCEDURE — 49650 LAP ING HERNIA REPAIR INIT: CPT | Mod: RT

## 2024-05-02 PROCEDURE — C1781: CPT

## 2024-05-02 PROCEDURE — 36415 COLL VENOUS BLD VENIPUNCTURE: CPT

## 2024-05-02 DEVICE — MESH HERNIA INGUINAL PARIETEX PROGRIP RECTANGLE 15 X 9CM: Type: IMPLANTABLE DEVICE | Site: RIGHT | Status: FUNCTIONAL

## 2024-05-02 RX ORDER — DOCUSATE SODIUM 100 MG
1 CAPSULE ORAL
Qty: 40 | Refills: 0
Start: 2024-05-02

## 2024-05-02 RX ORDER — HYDROMORPHONE HYDROCHLORIDE 2 MG/ML
0.5 INJECTION INTRAMUSCULAR; INTRAVENOUS; SUBCUTANEOUS
Refills: 0 | Status: DISCONTINUED | OUTPATIENT
Start: 2024-05-02 | End: 2024-05-03

## 2024-05-02 RX ORDER — ONDANSETRON 8 MG/1
4 TABLET, FILM COATED ORAL ONCE
Refills: 0 | Status: DISCONTINUED | OUTPATIENT
Start: 2024-05-02 | End: 2024-05-03

## 2024-05-02 RX ORDER — CEFAZOLIN SODIUM 1 G
2000 VIAL (EA) INJECTION ONCE
Refills: 0 | Status: COMPLETED | OUTPATIENT
Start: 2024-05-02 | End: 2024-05-02

## 2024-05-02 RX ORDER — SODIUM CHLORIDE 9 MG/ML
1000 INJECTION, SOLUTION INTRAVENOUS
Refills: 0 | Status: DISCONTINUED | OUTPATIENT
Start: 2024-05-02 | End: 2024-05-03

## 2024-05-02 RX ORDER — SODIUM CHLORIDE 9 MG/ML
1000 INJECTION, SOLUTION INTRAVENOUS
Refills: 0 | Status: DISCONTINUED | OUTPATIENT
Start: 2024-05-02 | End: 2024-05-02

## 2024-05-02 RX ORDER — TAMSULOSIN HYDROCHLORIDE 0.4 MG/1
0.8 CAPSULE ORAL AT BEDTIME
Refills: 0 | Status: DISCONTINUED | OUTPATIENT
Start: 2024-05-02 | End: 2024-05-16

## 2024-05-02 RX ORDER — SODIUM CHLORIDE 9 MG/ML
250 INJECTION INTRAMUSCULAR; INTRAVENOUS; SUBCUTANEOUS
Refills: 0 | Status: COMPLETED | OUTPATIENT
Start: 2024-05-02 | End: 2024-05-02

## 2024-05-02 RX ORDER — OXYCODONE HYDROCHLORIDE 5 MG/1
1 TABLET ORAL
Qty: 15 | Refills: 0
Start: 2024-05-02

## 2024-05-02 RX ORDER — ACETAMINOPHEN 500 MG
2 TABLET ORAL
Qty: 40 | Refills: 0
Start: 2024-05-02

## 2024-05-02 RX ADMIN — TAMSULOSIN HYDROCHLORIDE 0.8 MILLIGRAM(S): 0.4 CAPSULE ORAL at 13:55

## 2024-05-02 RX ADMIN — SODIUM CHLORIDE 250 MILLILITER(S): 9 INJECTION INTRAMUSCULAR; INTRAVENOUS; SUBCUTANEOUS at 13:45

## 2024-05-02 RX ADMIN — SODIUM CHLORIDE 75 MILLILITER(S): 9 INJECTION, SOLUTION INTRAVENOUS at 08:53

## 2024-05-02 RX ADMIN — SODIUM CHLORIDE 250 MILLILITER(S): 9 INJECTION INTRAMUSCULAR; INTRAVENOUS; SUBCUTANEOUS at 12:05

## 2024-05-02 NOTE — ASU DISCHARGE PLAN (ADULT/PEDIATRIC) - CARE PROVIDER_API CALL
Manuel Gordon [facility]  Surgery  [address], [address]  [place] [address]  Phone: [phone]  Fax: [phone]  Follow Up Time:

## 2024-05-02 NOTE — ASU DISCHARGE PLAN (ADULT/PEDIATRIC) - ASU DC SPECIAL INSTRUCTIONSFT
You may shower in 24 hours.  Do not remove wound glue.  Do not let water hit wounds directly, do not rub incisions.      No heavy lifting (nothing heavier than 5 lbs.), no strenuous physical activity, no exercise.      Do not drive for 24 hours after anesthesia.  Do not drive while taking narcotic pain medications.  Do not drive until pain-free.      You may perform your usual daily tasks as tolerated.      Keep your legs elevated as much as possible (with your feet 18-20 inches above your heart) to help prevent pain/throbbing/swelling at surgical site.     For pain after surgery, take the followin. 500mg Extra-Strength Tylenol - take 2 pills every 6 hours regularly for the first 3 days after surgery, then as needed.  DO NOT EXCEED 4,000MG OF ACETAMINOPHEN PER DAY.  2. If you still have pain despite the tylenol, then take the oxycodone as prescribed.  Take colace as prescribed while taking narcotic pain medication to prevent constipation.    RESUME YOUR ELIQUIS ON SATURDAY,      RESUME YOUR EFFIENT ON ,     Do NOT take NSAIDs with the eliquis and effient (as there is a considerable bleeding risk when taking all 3 together).      Resume your other usual daily medications as instructed in the discharge medication reconciliation.      You are encouraged to walk as much as possible to prevent blood clots in the legs, to maintain lung health after surgery/anesthesia, and to prevent constipation after surgery.      Continue to use the incentive spirometer at home.    Follow-up with Dr. Gordon in his office next week - call office for appointment if not already made.

## 2024-05-05 RX ORDER — PRASUGREL 5 MG/1
1 TABLET, FILM COATED ORAL
Qty: 0 | Refills: 0 | DISCHARGE
Start: 2024-05-05

## 2024-05-28 PROBLEM — K40.90 UNILATERAL INGUINAL HERNIA, WITHOUT OBSTRUCTION OR GANGRENE, NOT SPECIFIED AS RECURRENT: Chronic | Status: ACTIVE | Noted: 2024-04-23

## 2024-05-28 PROBLEM — Z95.5 PRESENCE OF CORONARY ANGIOPLASTY IMPLANT AND GRAFT: Chronic | Status: ACTIVE | Noted: 2024-04-23

## 2024-06-11 ENCOUNTER — TRANSCRIPTION ENCOUNTER (OUTPATIENT)
Age: 84
End: 2024-06-11

## 2024-06-11 ENCOUNTER — OUTPATIENT (OUTPATIENT)
Dept: INPATIENT UNIT | Facility: HOSPITAL | Age: 84
LOS: 1 days | End: 2024-06-11
Payer: MEDICARE

## 2024-06-11 VITALS
SYSTOLIC BLOOD PRESSURE: 113 MMHG | HEIGHT: 70 IN | RESPIRATION RATE: 18 BRPM | TEMPERATURE: 98 F | HEART RATE: 65 BPM | DIASTOLIC BLOOD PRESSURE: 66 MMHG | WEIGHT: 160.06 LBS

## 2024-06-11 VITALS
HEART RATE: 73 BPM | DIASTOLIC BLOOD PRESSURE: 66 MMHG | SYSTOLIC BLOOD PRESSURE: 114 MMHG | TEMPERATURE: 98 F | RESPIRATION RATE: 17 BRPM | OXYGEN SATURATION: 99 %

## 2024-06-11 DIAGNOSIS — Z95.5 PRESENCE OF CORONARY ANGIOPLASTY IMPLANT AND GRAFT: Chronic | ICD-10-CM

## 2024-06-11 DIAGNOSIS — I48.91 UNSPECIFIED ATRIAL FIBRILLATION: ICD-10-CM

## 2024-06-11 DIAGNOSIS — Z98.890 OTHER SPECIFIED POSTPROCEDURAL STATES: Chronic | ICD-10-CM

## 2024-06-11 DIAGNOSIS — Z90.89 ACQUIRED ABSENCE OF OTHER ORGANS: Chronic | ICD-10-CM

## 2024-06-11 LAB
ANION GAP SERPL CALC-SCNC: 12 MMOL/L — SIGNIFICANT CHANGE UP (ref 5–17)
BLD GP AB SCN SERPL QL: NEGATIVE — SIGNIFICANT CHANGE UP
BUN SERPL-MCNC: 22 MG/DL — SIGNIFICANT CHANGE UP (ref 7–23)
CALCIUM SERPL-MCNC: 9.5 MG/DL — SIGNIFICANT CHANGE UP (ref 8.4–10.5)
CHLORIDE SERPL-SCNC: 104 MMOL/L — SIGNIFICANT CHANGE UP (ref 96–108)
CO2 SERPL-SCNC: 27 MMOL/L — SIGNIFICANT CHANGE UP (ref 22–31)
CREAT SERPL-MCNC: 1.2 MG/DL — SIGNIFICANT CHANGE UP (ref 0.5–1.3)
EGFR: 59 ML/MIN/1.73M2 — LOW
GLUCOSE SERPL-MCNC: 91 MG/DL — SIGNIFICANT CHANGE UP (ref 70–99)
HCT VFR BLD CALC: 39.2 % — SIGNIFICANT CHANGE UP (ref 39–50)
HGB BLD-MCNC: 13.4 G/DL — SIGNIFICANT CHANGE UP (ref 13–17)
MCHC RBC-ENTMCNC: 34.2 GM/DL — SIGNIFICANT CHANGE UP (ref 32–36)
MCHC RBC-ENTMCNC: 34.2 PG — HIGH (ref 27–34)
MCV RBC AUTO: 100 FL — SIGNIFICANT CHANGE UP (ref 80–100)
NRBC # BLD: 0 /100 WBCS — SIGNIFICANT CHANGE UP (ref 0–0)
PLATELET # BLD AUTO: 166 K/UL — SIGNIFICANT CHANGE UP (ref 150–400)
POTASSIUM SERPL-MCNC: 4.5 MMOL/L — SIGNIFICANT CHANGE UP (ref 3.5–5.3)
POTASSIUM SERPL-SCNC: 4.5 MMOL/L — SIGNIFICANT CHANGE UP (ref 3.5–5.3)
RBC # BLD: 3.92 M/UL — LOW (ref 4.2–5.8)
RBC # FLD: 12.1 % — SIGNIFICANT CHANGE UP (ref 10.3–14.5)
RH IG SCN BLD-IMP: POSITIVE — SIGNIFICANT CHANGE UP
RH IG SCN BLD-IMP: POSITIVE — SIGNIFICANT CHANGE UP
SODIUM SERPL-SCNC: 143 MMOL/L — SIGNIFICANT CHANGE UP (ref 135–145)
WBC # BLD: 4.4 K/UL — SIGNIFICANT CHANGE UP (ref 3.8–10.5)
WBC # FLD AUTO: 4.4 K/UL — SIGNIFICANT CHANGE UP (ref 3.8–10.5)

## 2024-06-11 PROCEDURE — 93010 ELECTROCARDIOGRAM REPORT: CPT | Mod: 77

## 2024-06-11 PROCEDURE — 85027 COMPLETE CBC AUTOMATED: CPT

## 2024-06-11 PROCEDURE — 86900 BLOOD TYPING SEROLOGIC ABO: CPT

## 2024-06-11 PROCEDURE — C1786: CPT

## 2024-06-11 PROCEDURE — C1898: CPT

## 2024-06-11 PROCEDURE — 33225 L VENTRIC PACING LEAD ADD-ON: CPT

## 2024-06-11 PROCEDURE — C1887: CPT

## 2024-06-11 PROCEDURE — 71045 X-RAY EXAM CHEST 1 VIEW: CPT | Mod: 26

## 2024-06-11 PROCEDURE — 33207 INSERT HEART PM VENTRICULAR: CPT

## 2024-06-11 PROCEDURE — 80048 BASIC METABOLIC PNL TOTAL CA: CPT

## 2024-06-11 PROCEDURE — 71045 X-RAY EXAM CHEST 1 VIEW: CPT

## 2024-06-11 PROCEDURE — 36415 COLL VENOUS BLD VENIPUNCTURE: CPT

## 2024-06-11 PROCEDURE — C1894: CPT

## 2024-06-11 PROCEDURE — 93005 ELECTROCARDIOGRAM TRACING: CPT

## 2024-06-11 PROCEDURE — C1769: CPT

## 2024-06-11 PROCEDURE — 86901 BLOOD TYPING SEROLOGIC RH(D): CPT

## 2024-06-11 PROCEDURE — 86850 RBC ANTIBODY SCREEN: CPT

## 2024-06-11 RX ORDER — CEPHALEXIN 500 MG
500 CAPSULE ORAL THREE TIMES A DAY
Refills: 0 | Status: DISCONTINUED | OUTPATIENT
Start: 2024-06-12 | End: 2024-06-11

## 2024-06-11 RX ORDER — ACETAMINOPHEN 500 MG
2 TABLET ORAL
Qty: 320 | Refills: 5
Start: 2024-06-11 | End: 2025-02-05

## 2024-06-11 RX ORDER — SACUBITRIL AND VALSARTAN 24; 26 MG/1; MG/1
1 TABLET, FILM COATED ORAL
Refills: 0 | DISCHARGE

## 2024-06-11 RX ORDER — CEFAZOLIN SODIUM 1 G
2000 VIAL (EA) INJECTION ONCE
Refills: 0 | Status: DISCONTINUED | OUTPATIENT
Start: 2024-06-11 | End: 2024-06-11

## 2024-06-11 RX ORDER — CEFAZOLIN SODIUM 1 G
2000 VIAL (EA) INJECTION ONCE
Refills: 0 | Status: COMPLETED | OUTPATIENT
Start: 2024-06-11 | End: 2024-06-11

## 2024-06-11 RX ORDER — TAMSULOSIN HYDROCHLORIDE 0.4 MG/1
1 CAPSULE ORAL
Refills: 0 | DISCHARGE

## 2024-06-11 RX ORDER — FINASTERIDE 5 MG/1
1 TABLET, FILM COATED ORAL
Refills: 0 | DISCHARGE

## 2024-06-11 RX ORDER — FUROSEMIDE 40 MG
1 TABLET ORAL
Refills: 0 | DISCHARGE

## 2024-06-11 RX ORDER — CEPHALEXIN 500 MG
1 CAPSULE ORAL
Qty: 9 | Refills: 0
Start: 2024-06-11 | End: 2024-06-13

## 2024-06-11 RX ORDER — ACETAMINOPHEN 500 MG
650 TABLET ORAL EVERY 6 HOURS
Refills: 0 | Status: DISCONTINUED | OUTPATIENT
Start: 2024-06-11 | End: 2024-06-11

## 2024-06-11 RX ORDER — ATORVASTATIN CALCIUM 80 MG/1
1 TABLET, FILM COATED ORAL
Refills: 0 | DISCHARGE

## 2024-06-11 RX ORDER — SACUBITRIL AND VALSARTAN 24; 26 MG/1; MG/1
1 TABLET, FILM COATED ORAL
Refills: 0 | Status: DISCONTINUED | OUTPATIENT
Start: 2024-06-11 | End: 2024-06-11

## 2024-06-11 RX ORDER — CHLORHEXIDINE GLUCONATE 213 G/1000ML
1 SOLUTION TOPICAL ONCE
Refills: 0 | Status: DISCONTINUED | OUTPATIENT
Start: 2024-06-11 | End: 2024-06-11

## 2024-06-11 RX ADMIN — Medication 650 MILLIGRAM(S): at 14:38

## 2024-06-11 RX ADMIN — Medication 100 MILLIGRAM(S): at 17:57

## 2024-06-11 NOTE — ASU DISCHARGE PLAN (ADULT/PEDIATRIC) - ASU DC SPECIAL INSTRUCTIONSFT
Your incision is closed with either surgical tape, staples or a surgical glue  - DO NOT scrub, rub, or pick at the site    AFTER 3 days, you may shower   - Use mild sop and gentle water stream, then pat dry with a towel   - DO NOT apply lotions, powders, ointments, or perfumes to the incision    DO NOT soak your incisional site in water fo 4-6 weeks (no baths, swimming, hot tub...)  Wear loose clothing around site for 1-2 weeks  IF surgical tape was used DO NOT remove the strips, they will fall off on their own   IF surgical glue was used, it will naturally fall off within 3 weeks  if staples were used, they will be removed in 7-10 days by your doctor    ACTIVITY:  for 2 weeks AFTER  your procedure  - DO NOT RAISE your arm above shoulder level (on the same side of your incision)  for 4 weeks AFTER your procedure   - DO NOT LIFT anything 10 lbs or heavier (on the side of your implant)   - certain activities may be limited longer, those that involve swinging your arm, and will be discussed with your EP doctor  DO NOT DRIVE until your EP Doctor or nurse practitioner/ physician assistant states it is safe to do so  A follow up appointment in 7-14 days will be arranged before your discharge    ID CARD:   you will receive an ID CARD and device company booklet   - please carry that card with you at all times    DIET   Follow the heart healthy diet recommended by you doctor   If you smoke, we recommend you quit. It will immediately improve your health     - If you would like to let us help you live smoke-free, call the [facility] at [phone]    ***CALL YOUR DOCTOR ***  IF you have fever, chills, body aches, or severe pain, swelling, redness, heat, yellow drainage from your incision site  IF bleeding  or significant new swelling from your puncture site  IF your experience lightheadedness, dizziness, or fainting spell  IF unable to get in contact with your doctor, you may call the Cardiology Office at [facility] at [phone]

## 2024-06-11 NOTE — ASU DISCHARGE PLAN (ADULT/PEDIATRIC) - CARE PROVIDER_API CALL
Fransisco Suero  Cardiac Electrophysiology  [address] [place] [address], [facility] [address]  [place] [address]  Phone: [phone]  Fax: [phone]  Follow Up Time: 1 month

## 2024-06-11 NOTE — H&P CARDIOLOGY - HISTORY OF PRESENT ILLNESS
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 Pt reports dizziness. Pt denies syncope.   Pt presents today for PPM Implant.

## 2024-06-11 NOTE — PATIENT PROFILE ADULT - PATIENT REPRESENTATIVE: ( YOU CAN CHOOSE ANY PERSON THAT CAN ASSIST YOU WITH YOUR HEALTH CARE PREFERENCES, DOES NOT HAVE TO BE A SPOUSE, IMMEDIATE FAMILY OR SIGNIFICANT OTHER/PARTNER)
declines O-Z Flap Text: The defect edges were debeveled with a #15 scalpel blade.  Given the location of the defect, shape of the defect and the proximity to free margins an O-Z flap was deemed most appropriate.  Using a sterile surgical marker, an appropriate transposition flap was drawn incorporating the defect and placing the expected incisions within the relaxed skin tension lines where possible. The area thus outlined was incised deep to adipose tissue with a #15 scalpel blade.  The skin margins were undermined to an appropriate distance in all directions utilizing iris scissors.

## 2024-06-11 NOTE — CHART NOTE - NSCHARTNOTEFT_GEN_A_CORE
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     Left CW PPM site D&I,   VSS, Pt is alert and Ox3,     s/p BiV PPM (Metronic VVIR 50-80)  resume Eliquis 6/12 PM  CXR (P)  Ancef 17:30 PM,   Keflex 500mg TID for 3 days  may D/C home around 18:30  f/u with Dr. Suero

## 2024-06-11 NOTE — PRE-ANESTHESIA EVALUATION ADULT - NSANTHASARD_GEN_ALL_CORE
Delivery Read From Message Type Attachments Subject   3/5/2024  2:25 PM Y Columba Herbert RN Patient Medical Advice Request  Follow-up   Patient read [employer] message without response.     Has ER f/u today with Dr. Mujica. Will defer further assessment to him.     Dr. Gee- BARB patient had requested prednisone, historically for gout. He went to the ER after the request for the fall and has not responded to nursing inquiries if he still is having a flare. Please address at OV today.     Message closed.    3

## 2024-06-11 NOTE — ASU DISCHARGE PLAN (ADULT/PEDIATRIC) - NS MD DC FALL RISK RISK
For information on Fall & Injury Prevention, visit: [email or web address] OR  [email or web address] OR  https://www.cdc.gov/steadi/patient.html

## 2024-06-11 NOTE — PATIENT PROFILE ADULT - FALL HARM RISK - UNIVERSAL INTERVENTIONS
Bed in lowest position, wheels locked, appropriate side rails in place/Call bell, personal items and telephone in reach/Instruct patient to call for assistance before getting out of bed or chair/Non-slip footwear when patient is out of bed/[place] to call system/Physically safe environment - no spills, clutter or unnecessary equipment/Purposeful Proactive Rounding/Room/bathroom lighting operational, light cord in reach

## 2024-06-11 NOTE — PRE-ANESTHESIA EVALUATION ADULT - NSANTHOSAYNRD_GEN_A_CORE
Denies PEARL/No. PEARL screening performed.  STOP BANG Legend: 0-2 = LOW Risk; 3-4 = INTERMEDIATE Risk; 5-8 = HIGH Risk

## 2024-06-12 RX ORDER — APIXABAN 2.5 MG/1
1 TABLET, FILM COATED ORAL
Qty: 0 | Refills: 0 | DISCHARGE
Start: 2024-06-12

## 2024-06-26 ENCOUNTER — NON-APPOINTMENT (OUTPATIENT)
Age: 84
End: 2024-06-26

## 2024-06-26 ENCOUNTER — APPOINTMENT (OUTPATIENT)
Dept: OPHTHALMOLOGY | Facility: CLINIC | Age: 84
End: 2024-06-26
Payer: MEDICARE

## 2024-06-26 PROCEDURE — 92012 INTRM OPH EXAM EST PATIENT: CPT

## 2024-06-26 PROCEDURE — 92015 DETERMINE REFRACTIVE STATE: CPT

## 2024-08-16 NOTE — ED ADULT NURSE NOTE - NS ED NURSE DISCH DISPOSITION
Pt presents with concerns of UTI. Pt reports being in a hot tub this weekend and thinking that is the cause. Pt reports foul odor in urine, dysuria and cloudy urine. Symptom onset was yesterday.         
Discharged

## 2025-03-04 NOTE — ASU PATIENT PROFILE, ADULT - FALL HARM RISK - FACTORS NURSING JUDGEMENT
Problem: Patient Care Overview (Adult)  Goal: Plan of Care Review  Outcome: Ongoing (interventions implemented as appropriate)    03/24/17 1449   Coping/Psychosocial Response Interventions   Plan Of Care Reviewed With patient       Goal: Adult Individualization and Mutuality  Outcome: Ongoing (interventions implemented as appropriate)    03/24/17 1449   Individualization   Patient Specific Preferences none       Goal: Discharge Needs Assessment  Outcome: Ongoing (interventions implemented as appropriate)    03/24/17 1449   Discharge Needs Assessment   Concerns To Be Addressed no discharge needs identified   Living Environment   Transportation Available family or friend will provide         Problem: GI Endoscopy (Adult)  Goal: Signs and Symptoms of Listed Potential Problems Will be Absent or Manageable (GI Endoscopy)  Outcome: Ongoing (interventions implemented as appropriate)    03/24/17 1449   GI Endoscopy   Problems Assessed (GI Endoscopy) pain   Problems Present (GI Endoscopy) none            - - - No

## (undated) DEVICE — SOL IRR BAG NS 0.9% 1000ML

## (undated) DEVICE — Device

## (undated) DEVICE — SOL IRR POUR H2O 1000ML

## (undated) DEVICE — DRAPE TOWEL BLUE 17" X 24"

## (undated) DEVICE — WARMING BLANKET UPPER ADULT

## (undated) DEVICE — TROCAR COVIDIEN VERSAPORT BLADELESS OPTICAL 5MM STANDARD

## (undated) DEVICE — DRAPE 3/4 SHEET W REINFORCEMENT 56X77"

## (undated) DEVICE — PACK GENERAL LAPAROSCOPY

## (undated) DEVICE — FOLEY TRAY 16FR LF URINE METER SURESTEP

## (undated) DEVICE — XI SEAL UNIV 5- 8 MM

## (undated) DEVICE — SUT VLOC 180 3-0 6" V-20 GREEN

## (undated) DEVICE — TUBING STRYKER PNEUMOSURE HEATED RTP

## (undated) DEVICE — D HELP - CLEARVIEW CLEARIFY SYSTEM

## (undated) DEVICE — SUT MONOCRYL 4-0 27" PS-2 UNDYED

## (undated) DEVICE — ELCTR BOVIE PENCIL SMOKE EVACUATION

## (undated) DEVICE — XI ENDOWRIST SUCTION IRRIGATOR 8MM

## (undated) DEVICE — PLV/PSP-ESU T7E14761DX: Type: DURABLE MEDICAL EQUIPMENT

## (undated) DEVICE — DRSG OPSITE 2.5 X 2"

## (undated) DEVICE — VENODYNE/SCD SLEEVE CALF LARGE

## (undated) DEVICE — XI ARM FORCEP FENESTRATED BIPOLAR 8MM

## (undated) DEVICE — XI OBTURATOR OPTICAL BLADELESS 8MM

## (undated) DEVICE — DRSG STERISTRIPS 0.5 X 4"

## (undated) DEVICE — VENODYNE/SCD SLEEVE CALF MEDIUM

## (undated) DEVICE — XI DRAPE ARM

## (undated) DEVICE — XI TIP COVER

## (undated) DEVICE — DRSG DERMABOND 0.7ML

## (undated) DEVICE — PLV-SCD MACHINE: Type: DURABLE MEDICAL EQUIPMENT

## (undated) DEVICE — XI ARM SCISSOR MONO CURVED

## (undated) DEVICE — GOWN SMARTGOWN RAGLAN XLG

## (undated) DEVICE — XI ARM NEEDLE DRIVER SUTURECUT MEGA 8MM

## (undated) DEVICE — NDL HYPO SAFE 25G X 1.5" (ORANGE)

## (undated) DEVICE — ELCTR BOVIE TIP BLADE INSULATED 2.75" EDGE

## (undated) DEVICE — SOL IRR POUR NS 0.9% 1000ML

## (undated) DEVICE — XI DRAPE COLUMN

## (undated) DEVICE — GLV 7.5 PROTEXIS (WHITE)